# Patient Record
Sex: MALE | Race: WHITE | Employment: OTHER | URBAN - NONMETROPOLITAN AREA
[De-identification: names, ages, dates, MRNs, and addresses within clinical notes are randomized per-mention and may not be internally consistent; named-entity substitution may affect disease eponyms.]

---

## 2017-01-31 ENCOUNTER — COMMUNICATION - GICH (OUTPATIENT)
Dept: INTERNAL MEDICINE | Facility: OTHER | Age: 57
End: 2017-01-31

## 2017-01-31 DIAGNOSIS — C67.9 MALIGNANT NEOPLASM OF BLADDER (H): ICD-10-CM

## 2017-01-31 DIAGNOSIS — R39.89 OTHER SYMPTOMS AND SIGNS INVOLVING THE GENITOURINARY SYSTEM: ICD-10-CM

## 2017-01-31 DIAGNOSIS — C68.9 MALIGNANT NEOPLASM OF URINARY ORGAN (H): ICD-10-CM

## 2017-04-10 ENCOUNTER — COMMUNICATION - GICH (OUTPATIENT)
Dept: INTERNAL MEDICINE | Facility: OTHER | Age: 57
End: 2017-04-10

## 2017-04-10 DIAGNOSIS — J44.9 CHRONIC OBSTRUCTIVE PULMONARY DISEASE (H): ICD-10-CM

## 2017-04-13 ENCOUNTER — HISTORY (OUTPATIENT)
Dept: INTERNAL MEDICINE | Facility: OTHER | Age: 57
End: 2017-04-13

## 2017-04-13 ENCOUNTER — OFFICE VISIT - GICH (OUTPATIENT)
Dept: INTERNAL MEDICINE | Facility: OTHER | Age: 57
End: 2017-04-13

## 2017-04-13 ENCOUNTER — COMMUNICATION - GICH (OUTPATIENT)
Dept: SURGERY | Facility: OTHER | Age: 57
End: 2017-04-13

## 2017-04-13 DIAGNOSIS — E78.5 HYPERLIPIDEMIA: ICD-10-CM

## 2017-04-13 DIAGNOSIS — I10 ESSENTIAL (PRIMARY) HYPERTENSION: ICD-10-CM

## 2017-04-13 DIAGNOSIS — Z85.72 HISTORY OF NON-HODGKIN'S LYMPHOMA: ICD-10-CM

## 2017-04-13 DIAGNOSIS — C62.90 MALIGNANT NEOPLASM OF TESTIS (H): ICD-10-CM

## 2017-04-13 DIAGNOSIS — Z12.11 ENCOUNTER FOR SCREENING FOR MALIGNANT NEOPLASM OF COLON: ICD-10-CM

## 2017-04-13 DIAGNOSIS — N18.30 CHRONIC KIDNEY DISEASE, STAGE III (MODERATE) (H): ICD-10-CM

## 2017-04-13 DIAGNOSIS — C68.9 MALIGNANT NEOPLASM OF URINARY ORGAN (H): ICD-10-CM

## 2017-04-13 DIAGNOSIS — K21.9 GASTRO-ESOPHAGEAL REFLUX DISEASE WITHOUT ESOPHAGITIS: ICD-10-CM

## 2017-04-13 DIAGNOSIS — F17.200 NICOTINE DEPENDENCE, UNCOMPLICATED: ICD-10-CM

## 2017-04-13 DIAGNOSIS — Z00.00 ENCOUNTER FOR GENERAL ADULT MEDICAL EXAMINATION WITHOUT ABNORMAL FINDINGS: ICD-10-CM

## 2017-04-13 DIAGNOSIS — I25.10 ATHEROSCLEROTIC HEART DISEASE OF NATIVE CORONARY ARTERY WITHOUT ANGINA PECTORIS: ICD-10-CM

## 2017-04-13 LAB
A/G RATIO - HISTORICAL: 1.3 (ref 1–2)
ABSOLUTE BASOPHILS - HISTORICAL: 0.1 THOU/CU MM
ABSOLUTE EOSINOPHILS - HISTORICAL: 0.3 THOU/CU MM
ABSOLUTE LYMPHOCYTES - HISTORICAL: 2.1 THOU/CU MM (ref 0.9–2.9)
ABSOLUTE MONOCYTES - HISTORICAL: 0.6 THOU/CU MM
ABSOLUTE NEUTROPHILS - HISTORICAL: 5.6 THOU/CU MM (ref 1.7–7)
ALBUMIN SERPL-MCNC: 4.3 G/DL (ref 3.5–5.7)
ALP SERPL-CCNC: 84 IU/L (ref 34–104)
ALT (SGPT) - HISTORICAL: 11 IU/L (ref 7–52)
ANION GAP - HISTORICAL: 13 (ref 5–18)
AST SERPL-CCNC: 18 IU/L (ref 13–39)
BASOPHILS # BLD AUTO: 0.8 %
BILIRUB SERPL-MCNC: 0.5 MG/DL (ref 0.3–1)
BUN SERPL-MCNC: 39 MG/DL (ref 7–25)
BUN/CREAT RATIO - HISTORICAL: 24
CALCIUM SERPL-MCNC: 9.9 MG/DL (ref 8.6–10.3)
CHLORIDE SERPLBLD-SCNC: 101 MMOL/L (ref 98–107)
CHOL/HDL RATIO - HISTORICAL: 3.5
CHOLESTEROL TOTAL: 168 MG/DL
CO2 SERPL-SCNC: 27 MMOL/L (ref 21–31)
CREAT SERPL-MCNC: 1.61 MG/DL (ref 0.7–1.3)
EOSINOPHIL NFR BLD AUTO: 3.9 %
ERYTHROCYTE [DISTWIDTH] IN BLOOD BY AUTOMATED COUNT: 11.9 % (ref 11.5–15.5)
GFR IF NOT AFRICAN AMERICAN - HISTORICAL: 44 ML/MIN/1.73M2
GLOBULIN - HISTORICAL: 3.4 G/DL (ref 2–3.7)
GLUCOSE SERPL-MCNC: 94 MG/DL (ref 70–105)
HCT VFR BLD AUTO: 50.7 % (ref 37–53)
HDLC SERPL-MCNC: 48 MG/DL (ref 23–92)
HEMOGLOBIN: 16.2 G/DL (ref 13.5–17.5)
LDLC SERPL CALC-MCNC: 93 MG/DL
LYMPHOCYTES NFR BLD AUTO: 24.5 % (ref 20–44)
MCH RBC QN AUTO: 29.5 PG (ref 26–34)
MCHC RBC AUTO-ENTMCNC: 32 G/DL (ref 32–36)
MCV RBC AUTO: 92 FL (ref 80–100)
MONOCYTES NFR BLD AUTO: 6.6 %
NEUTROPHILS NFR BLD AUTO: 64.2 % (ref 42–72)
NON-HDL CHOLESTEROL - HISTORICAL: 120 MG/DL
PATIENT STATUS - HISTORICAL: NORMAL
PLATELET # BLD AUTO: 260 THOU/CU MM (ref 140–440)
PMV BLD: 8.1 FL (ref 6.5–11)
POTASSIUM SERPL-SCNC: 5 MMOL/L (ref 3.5–5.1)
PROT SERPL-MCNC: 7.7 G/DL (ref 6.4–8.9)
RED BLOOD COUNT - HISTORICAL: 5.5 MIL/CU MM (ref 4.3–5.9)
SODIUM SERPL-SCNC: 141 MMOL/L (ref 133–143)
TRIGL SERPL-MCNC: 134 MG/DL
WHITE BLOOD COUNT - HISTORICAL: 8.7 THOU/CU MM (ref 4.5–11)

## 2017-04-26 ENCOUNTER — COMMUNICATION - GICH (OUTPATIENT)
Dept: INTERNAL MEDICINE | Facility: OTHER | Age: 57
End: 2017-04-26

## 2017-04-26 DIAGNOSIS — C67.9 MALIGNANT NEOPLASM OF BLADDER (H): ICD-10-CM

## 2017-04-26 DIAGNOSIS — C68.9 MALIGNANT NEOPLASM OF URINARY ORGAN (H): ICD-10-CM

## 2017-04-26 DIAGNOSIS — R39.89 OTHER SYMPTOMS AND SIGNS INVOLVING THE GENITOURINARY SYSTEM: ICD-10-CM

## 2017-04-26 DIAGNOSIS — I10 ESSENTIAL (PRIMARY) HYPERTENSION: ICD-10-CM

## 2017-05-04 ENCOUNTER — SURGERY (OUTPATIENT)
Dept: SURGERY | Facility: OTHER | Age: 57
End: 2017-05-04

## 2017-05-10 ENCOUNTER — OFFICE VISIT - GICH (OUTPATIENT)
Dept: INTERNAL MEDICINE | Facility: OTHER | Age: 57
End: 2017-05-10

## 2017-05-10 ENCOUNTER — HISTORY (OUTPATIENT)
Dept: INTERNAL MEDICINE | Facility: OTHER | Age: 57
End: 2017-05-10

## 2017-05-10 ENCOUNTER — HOSPITAL ENCOUNTER (OUTPATIENT)
Dept: RADIOLOGY | Facility: OTHER | Age: 57
End: 2017-05-10
Attending: INTERNAL MEDICINE

## 2017-05-10 DIAGNOSIS — R05.9 COUGH: ICD-10-CM

## 2017-05-10 DIAGNOSIS — R50.9 FEVER: ICD-10-CM

## 2017-05-10 LAB
ABSOLUTE BASOPHILS - HISTORICAL: 0.1 THOU/CU MM
ABSOLUTE EOSINOPHILS - HISTORICAL: 0 THOU/CU MM
ABSOLUTE LYMPHOCYTES - HISTORICAL: 0.5 THOU/CU MM (ref 0.9–2.9)
ABSOLUTE MONOCYTES - HISTORICAL: 0.5 THOU/CU MM
ABSOLUTE NEUTROPHILS - HISTORICAL: 5.2 THOU/CU MM (ref 1.7–7)
ANION GAP - HISTORICAL: 10 (ref 5–18)
BASOPHILS # BLD AUTO: 2.4 %
BUN SERPL-MCNC: 24 MG/DL (ref 7–25)
BUN/CREAT RATIO - HISTORICAL: 15
CALCIUM SERPL-MCNC: 9.6 MG/DL (ref 8.6–10.3)
CHLORIDE SERPLBLD-SCNC: 101 MMOL/L (ref 98–107)
CO2 SERPL-SCNC: 22 MMOL/L (ref 21–31)
CREAT SERPL-MCNC: 1.65 MG/DL (ref 0.7–1.3)
EOSINOPHIL NFR BLD AUTO: 0.3 %
ERYTHROCYTE [DISTWIDTH] IN BLOOD BY AUTOMATED COUNT: 12 % (ref 11.5–15.5)
GFR IF NOT AFRICAN AMERICAN - HISTORICAL: 43 ML/MIN/1.73M2
GLUCOSE SERPL-MCNC: 97 MG/DL (ref 70–105)
HCT VFR BLD AUTO: 49.3 % (ref 37–53)
HEMOGLOBIN: 15.6 G/DL (ref 13.5–17.5)
INFLUENZA ANTIGEN - HISTORICAL: NORMAL
LYMPHOCYTES NFR BLD AUTO: 7.6 % (ref 20–44)
MCH RBC QN AUTO: 29 PG (ref 26–34)
MCHC RBC AUTO-ENTMCNC: 31.7 G/DL (ref 32–36)
MCV RBC AUTO: 92 FL (ref 80–100)
MONOCYTES NFR BLD AUTO: 7.3 %
NEUTROPHILS NFR BLD AUTO: 82.5 % (ref 42–72)
PLATELET # BLD AUTO: 234 THOU/CU MM (ref 140–440)
PMV BLD: 7.9 FL (ref 6.5–11)
POTASSIUM SERPL-SCNC: 4 MMOL/L (ref 3.5–5.1)
RED BLOOD COUNT - HISTORICAL: 5.39 MIL/CU MM (ref 4.3–5.9)
SODIUM SERPL-SCNC: 133 MMOL/L (ref 133–143)
WHITE BLOOD COUNT - HISTORICAL: 6.3 THOU/CU MM (ref 4.5–11)

## 2017-05-11 ENCOUNTER — COMMUNICATION - GICH (OUTPATIENT)
Dept: INTERNAL MEDICINE | Facility: OTHER | Age: 57
End: 2017-05-11

## 2017-05-11 LAB — LYME SCREEN W/REFLEX WEST BLOT - HISTORICAL: NEGATIVE

## 2017-05-12 ENCOUNTER — COMMUNICATION - GICH (OUTPATIENT)
Dept: INTERNAL MEDICINE | Facility: OTHER | Age: 57
End: 2017-05-12

## 2017-05-12 LAB
ANAPLASMA PHAGOCYTOPHILUM - HISTORICAL: NEGATIVE
EHRLICHIA CHAFFEENSIS - HISTORICAL: NEGATIVE
EHRLICHIA EWINGII/CANIS - HISTORICAL: NEGATIVE
EHRLICHIA MURIS-LIKE - HISTORICAL: NEGATIVE

## 2017-05-15 ENCOUNTER — AMBULATORY - GICH (OUTPATIENT)
Dept: SCHEDULING | Facility: OTHER | Age: 57
End: 2017-05-15

## 2017-05-15 LAB — CULTURE - HISTORICAL: NORMAL

## 2017-05-23 ENCOUNTER — HISTORY (OUTPATIENT)
Dept: INTERNAL MEDICINE | Facility: OTHER | Age: 57
End: 2017-05-23

## 2017-05-23 ENCOUNTER — OFFICE VISIT - GICH (OUTPATIENT)
Dept: INTERNAL MEDICINE | Facility: OTHER | Age: 57
End: 2017-05-23

## 2017-05-23 DIAGNOSIS — N18.30 CHRONIC KIDNEY DISEASE, STAGE III (MODERATE) (H): ICD-10-CM

## 2017-05-23 DIAGNOSIS — J44.9 CHRONIC OBSTRUCTIVE PULMONARY DISEASE (H): ICD-10-CM

## 2017-05-23 DIAGNOSIS — C68.9 MALIGNANT NEOPLASM OF URINARY ORGAN (H): ICD-10-CM

## 2017-08-08 ENCOUNTER — COMMUNICATION - GICH (OUTPATIENT)
Dept: INTERNAL MEDICINE | Facility: OTHER | Age: 57
End: 2017-08-08

## 2017-08-08 DIAGNOSIS — I10 ESSENTIAL (PRIMARY) HYPERTENSION: ICD-10-CM

## 2017-08-13 ENCOUNTER — COMMUNICATION - GICH (OUTPATIENT)
Dept: INTERNAL MEDICINE | Facility: OTHER | Age: 57
End: 2017-08-13

## 2017-08-13 DIAGNOSIS — I10 ESSENTIAL (PRIMARY) HYPERTENSION: ICD-10-CM

## 2017-08-21 ENCOUNTER — COMMUNICATION - GICH (OUTPATIENT)
Dept: INTERNAL MEDICINE | Facility: OTHER | Age: 57
End: 2017-08-21

## 2017-09-23 ENCOUNTER — COMMUNICATION - GICH (OUTPATIENT)
Dept: INTERNAL MEDICINE | Facility: OTHER | Age: 57
End: 2017-09-23

## 2017-09-23 DIAGNOSIS — K21.9 GASTRO-ESOPHAGEAL REFLUX DISEASE WITHOUT ESOPHAGITIS: ICD-10-CM

## 2017-09-26 ENCOUNTER — COMMUNICATION - GICH (OUTPATIENT)
Dept: INTERNAL MEDICINE | Facility: OTHER | Age: 57
End: 2017-09-26

## 2017-10-18 ENCOUNTER — OFFICE VISIT - GICH (OUTPATIENT)
Dept: INTERNAL MEDICINE | Facility: OTHER | Age: 57
End: 2017-10-18

## 2017-10-18 ENCOUNTER — HISTORY (OUTPATIENT)
Dept: INTERNAL MEDICINE | Facility: OTHER | Age: 57
End: 2017-10-18

## 2017-10-18 DIAGNOSIS — C68.9 MALIGNANT NEOPLASM OF URINARY ORGAN (H): ICD-10-CM

## 2017-10-18 LAB
BACTERIA URINE: ABNORMAL BACTERIA/HPF
BILIRUB UR QL: NEGATIVE
CLARITY, URINE: CLEAR CLARITY
COLOR UR: YELLOW COLOR
EPITHELIAL CELLS: ABNORMAL EPI/HPF
GLUCOSE URINE: NEGATIVE MG/DL
KETONES UR QL: NEGATIVE MG/DL
LEUKOCYTE ESTERASE URINE: NEGATIVE
NITRITE UR QL STRIP: NEGATIVE
OCCULT BLOOD,URINE - HISTORICAL: ABNORMAL
PH UR: 6 [PH]
PROTEIN QUALITATIVE,URINE - HISTORICAL: ABNORMAL MG/DL
RBC - HISTORICAL: ABNORMAL /HPF
SP GR UR STRIP: 1.02
UROBILINOGEN,QUALITATIVE - HISTORICAL: NORMAL EU/DL
WBC - HISTORICAL: ABNORMAL /HPF

## 2017-10-18 ASSESSMENT — PATIENT HEALTH QUESTIONNAIRE - PHQ9: SUM OF ALL RESPONSES TO PHQ QUESTIONS 1-9: 0

## 2017-11-14 ENCOUNTER — COMMUNICATION - GICH (OUTPATIENT)
Dept: INTERNAL MEDICINE | Facility: OTHER | Age: 57
End: 2017-11-14

## 2017-11-14 DIAGNOSIS — J44.9 CHRONIC OBSTRUCTIVE PULMONARY DISEASE (H): ICD-10-CM

## 2017-12-04 ENCOUNTER — COMMUNICATION - GICH (OUTPATIENT)
Dept: INTERNAL MEDICINE | Facility: OTHER | Age: 57
End: 2017-12-04

## 2017-12-04 DIAGNOSIS — J44.9 CHRONIC OBSTRUCTIVE PULMONARY DISEASE (H): ICD-10-CM

## 2017-12-19 ENCOUNTER — COMMUNICATION - GICH (OUTPATIENT)
Dept: INTERNAL MEDICINE | Facility: OTHER | Age: 57
End: 2017-12-19

## 2017-12-19 DIAGNOSIS — C68.9 MALIGNANT NEOPLASM OF URINARY ORGAN (H): ICD-10-CM

## 2017-12-28 NOTE — TELEPHONE ENCOUNTER
Patient Information     Patient Name MRN Sex Luis A Garcia [id number] Male 1960      Telephone Encounter by Isha Raines RN at 2017  3:46 PM     Author:  Isha Raines RN Service:  (none) Author Type:  NURS- Registered Nurse     Filed:  2017  3:49 PM Encounter Date:  2017 Status:  Signed     :  Isha Raines RN (NURS- Registered Nurse)            Calcium Channel Blockers    Office visit in the past 12 months or per provider note.    Last visit with SYLVAIN DYER was on: 2016 in [facility]  Next visit with SYLVAIN DYER is on: No future appointment listed with this provider  Next visit with Internal Medicine is on: No future appointment listed in this department  BP Readings from Last 4 Encounters:    17 128/86   05/10/17 122/92   17 116/72   16 118/68     Patient is due for an appointment, letter sent.  Review last provider visit note.  If BP reviewed and plan is noted, can refill.  Max refill for 12 months from last office visit or per provider note.  Prescription refilled per RN Medication Refill Policy.................... ISHA RAINES RN ....................  2017   3:47 PM

## 2017-12-28 NOTE — TELEPHONE ENCOUNTER
Patient Information     Patient Name MRN Sex Luis A Garcia [id number] Male 1960      Telephone Encounter by Sherrie Estrella RN at 2017  2:33 PM     Author:  Sherrie Estrella RN Service:  (none) Author Type:  NURS- Registered Nurse     Filed:  2017  2:40 PM Encounter Date:  2017 Status:  Signed     :  Sherrie Estrella RN (NURS- Registered Nurse)            Per pharmacist Trevor, they never received order on 17 for omeprazole. Verbal order given, read back, and verified.  Sherrie Estrella LPN..................2017  2:40 PM

## 2017-12-28 NOTE — PROGRESS NOTES
Patient Information     Patient Name MRN Sex Luis A Garcia [id number] Male 1960      Progress Notes by Tin Fink MD at 10/18/2017  9:00 AM     Author:  Tin Fink MD Service:  (none) Author Type:  Physician     Filed:  10/18/2017  9:52 AM Encounter Date:  10/18/2017 Status:  Signed     :  Tin Fink MD (Physician)            SUBJECTIVE:    Luis A Gonzales is a 57 y.o. male who presents for urinary complaints.    HPI Comments: This patient is here today with the complaints of blood in his urine. This happened earlier this week on one occasion. He has a history of bladder cancer. He will be going back down to the [facility] shortly for follow-up cystoscopy. He wants to make sure that he doesn't have an infection in his urine because they won't do a cystoscopy if that is the case. He otherwise feels well. The urinary problem was entirely asymptomatic.      Allergies     Allergen  Reactions     Statins-Hmg-Coa Reductase Inhibitors Myalgia     Venom-Honey Bee Anaphylaxis   ,   Current Outpatient Prescriptions     Medication  Sig     amLODIPine (NORVASC) 10 mg tablet TAKE 1 TABLET BY MOUTH EVERY DAY     aspirin chewable 81 mg chewable tablet Take 1 tablet by mouth once daily with a meal.     carvedilol (COREG) 3.125 mg tablet TAKE 1 TABLET BY MOUTH TWICE DAILY     NITROSTAT 0.4 mg sublingual tablet      omeprazole (PRILOSEC) 20 mg Delayed-Release capsule Take 1 capsule by mouth once daily before a meal.     VENTOLIN HFA 90 mcg/actuation inhaler INHALE 1 TO 2 PUFFS BY MOUTH EVERY 2 HOURS IF NEEDED FOR SHORTNESS OF BREATH OR WHEEZING     No current facility-administered medications for this visit.      Medications have been reviewed by me and are current to the best of my knowledge and ability. ,   Past Medical History:     Diagnosis  Date     Bladder cancer (HC)      CAD (coronary artery disease)     MI times 3      CKD (chronic kidney disease) stage 3, GFR 30-59 ml/min      Elevated  hemoglobin (HC) 06/22/09    19.7, likely secondary to tobacco abuse.  Will consider further evaluation for polycythemia vera in August 2009 when patient follows up for complete physical.       Hyperlipidemia - Intolerant of Statins 2/12/2013     Hypertension      Lymphoma in remission (HC)      Peripheral neuropathy (HC)     felt to be related to chemo      Radiation-induced heart disease - Coronary Artery Disease 2/12/2013     Testicular cancer (HC)     and   Patient Active Problem List       Diagnosis  Date Noted     Urothelial carcinoma (HCC) - of Bladder  06/14/2016     Laryngopharyngeal reflux disease  06/17/2015     Erectile dysfunction  06/17/2015     COPD (chronic obstructive pulmonary disease) (HC)  06/17/2015     Squamous papilloma of soft palate - Dx 8/2014 - [facility]  08/20/2014     POSSIBLE -- Radiation-induced heart disease - Coronary Artery Disease  02/12/2013     Hyperlipidemia - Intolerant of Statins  02/12/2013     Hypertension       PERICARDIAL EFFUSION S/P STENT AND PERICARDIOCENTESIS  02/18/2011     ACP (advance care planning)  02/11/2011     Patient has identified Health Care Agent(s): No  Add Health Care Agents: No  Patient has Advance Care Plan Documents (Health Care Directive, POLST): No, Pt declined information..    Patient has identified Specific Treatment Preferences: No   Specific limits to treatment preferences NOT identified: ASSUME FULL TREATMENT.    2/11/11:  Pt states that his family knows his wishes but that he hasn't put them down formally in witting. He declined receiving written information at this time but stated that his cousin, Dr. Trevor Cisneros- Head of Emergency Services at [facility] would probably be his HCA although he hasn't talked with him about this responsibility to date.   Nohemi Suarez, [facility] Hospital[facility]...Pager: [phone]  : [phone]              CAD (coronary artery disease)  02/11/2011     -Level 1 STEMI 2/9/11 at [facility] - successful PCI BMSx2 of proximal LAD   -  Following angio, developed cardiac tamponade from contained perforation, s/p pericardiocentesis with drain left in, total of 250 ccs removed, started on colchicine  - 5/6/11 cor angio for CP: patent stents  - Level 1 STEMI 2/2/2013 at [facility] - Due to late in-stent thrombosis with preserved EF of 50-55% - successful PTCA to the entire LAD; thrombectomy; IC Eptifibatide, and BMS x1 and JOSE ANTONIO x2 to the mid LAD with coronary thrombectomy 2/3/2013        CKD (chronic kidney disease) stage 3, GFR 30-59 ml/min       Tobacco dependency  02/09/2011     Testicular cancer (HC)  02/09/2011     Lymphoma in remission (HC)  02/09/2011     APHTHOUS ULCERS  11/11/2010       REVIEW OF SYSTEMS:  Review of Systems   All other systems reviewed and are negative.      OBJECTIVE:  /82  Pulse 72  Ht 1.829 m (6')  Wt 102.6 kg (226 lb 3.2 oz)  BMI 30.68 kg/m2    EXAM:   Physical Exam   Constitutional: He is well-developed, well-nourished, and in no distress. No distress.   Skin: He is not diaphoretic.   Nursing note and vitals reviewed.      ASSESSMENT/PLAN:    ICD-10-CM    1. Urothelial carcinoma (HCC) - of Bladder C68.9 URINALYSIS W REFLEX MICROSCOPIC IF POSITIVE      URINALYSIS W REFLEX MICROSCOPIC IF POSITIVE      URINALYSIS MICROSCOPIC      URINALYSIS MICROSCOPIC        Plan:  Urine today shows microscopic hematuria but no evidence for infection. He was reassured. He will follow-up with [facility] next week for his cystoscopy as planned.

## 2017-12-28 NOTE — TELEPHONE ENCOUNTER
Patient Information     Patient Name MRN Sex Luis A Garcia [id number] Male 1960      Telephone Encounter by Barbara Chung RN at 2017  9:43 AM     Author:  Barbara Chung RN Service:  (none) Author Type:  NURS- Registered Nurse     Filed:  2017  9:44 AM Encounter Date:  2017 Status:  Signed     :  Barbara Chung RN (NURS- Registered Nurse)            Calcium Channel Blockers    Office visit in the past 12 months or per provider note.    Last visit with ALYSSA KELLY was on: 2017 in [facility]  Next visit with ALYSSA KELLY is on: No future appointment listed with this provider  Next visit with Internal Medicine is on: No future appointment listed in this department  BP Readings from Last 4 Encounters:    17 128/86   05/10/17 122/92   17 116/72   16 118/68       Review last provider visit note.  If BP reviewed and plan is noted, can refill.  Max refill for 12 months from last office visit or per provider note.    Prescription refilled per RN Medication Refill Policy.................... BARBARA CHUNG RN ....................  2017   9:43 AM

## 2017-12-28 NOTE — TELEPHONE ENCOUNTER
Patient Information     Patient Name MRN Sex Luis A Garcia [id number] Male 1960      Telephone Encounter by Gloria Fuchs LPN at 2017 12:50 PM     Author:  Gloria Fuchs LPN Service:  (none) Author Type:  NURS- Licensed Practical Nurse     Filed:  2017 12:56 PM Encounter Date:  2017 Status:  Signed     :  Gloria Fuchs LPN (NURS- Licensed Practical Nurse)            The patient called and stated last summer he used an nebulizer to help with his COPD. He stated he called his insurance company and said they would pay for one for him without being seen and needs the prescriptions faxed to his insurance company and sent to [facility]. The fax number is [phone]. Orders are teed up below.  Gloria Fuchs LPN......2017  12:55 PM

## 2017-12-28 NOTE — TELEPHONE ENCOUNTER
Patient Information     Patient Name MRN Sex Luis A Garcia [id number] Male 1960      Telephone Encounter by Gloria Fuchs LPN at 2017  8:05 AM     Author:  Gloria Fuchs LPN Service:  (none) Author Type:  NURS- Licensed Practical Nurse     Filed:  2017  8:08 AM Encounter Date:  2017 Status:  Signed     :  Gloria Fuchs LPN (NURS- Licensed Practical Nurse)            Contacted the patient and let him know on  a prescription was sent in to his pharmacy for his amlodipine with 90 tablets and two refills.  Gloria Fuchs LPN......2017  8:08 AM

## 2017-12-28 NOTE — TELEPHONE ENCOUNTER
Patient Information     Patient Name MRN Sex Luis A Garcia [id number] Male 1960      Telephone Encounter by Radha Dowd RN at 2017  1:35 PM     Author:  Radha Dowd RN Service:  (none) Author Type:  NURS- Registered Nurse     Filed:  2017  1:37 PM Encounter Date:  2017 Status:  Signed     :  Radha Dowd RN (NURS- Registered Nurse)            Prilosec refilled on 17 #180 x 2 refills to [facility]  Unable to complete prescription refill per RN Medication Refill Policy.................... RADHA DOWD RN ....................  2017   1:36 PM

## 2017-12-28 NOTE — TELEPHONE ENCOUNTER
Patient Information     Patient Name MRN Sex Luis A Garcia [id number] Male 1960      Telephone Encounter by Isha Raines RN at 2017  3:40 PM     Author:  Isha Raines RN Service:  (none) Author Type:  NURS- Registered Nurse     Filed:  2017  3:40 PM Encounter Date:  2017 Status:  Signed     :  Isha Raines RN (NURS- Registered Nurse)            Refilled 17.  Unable to complete prescription refill per RN Medication Refill Policy.................... ISHA RAINES RN ....................  2017   3:40 PM

## 2017-12-30 NOTE — NURSING NOTE
Patient Information     Patient Name MRN Sex Luis A Garcia [id number] Male 1960      Nursing Note by Gloria Fuchs LPN at 10/18/2017  9:00 AM     Author:  Gloria Fuchs LPN Service:  (none) Author Type:  NURS- Licensed Practical Nurse     Filed:  10/18/2017  9:10 AM Encounter Date:  10/18/2017 Status:  Signed     :  Gloria Fuchs LPN (NURS- Licensed Practical Nurse)            The patient is here today to be seen for a urinary issue. He states on Monday 10-16 he urinated and saw lots of blood.  Gloria Fuchs LPN......10/18/2017  8:57 AM

## 2018-01-03 NOTE — TELEPHONE ENCOUNTER
Patient Information     Patient Name MRN Sex Luis A Garcia [id number] Male 1960      Telephone Encounter by Mariella Walsh at 2017  4:50 PM     Author:  Mariella Walsh Service:  (none) Author Type:  (none)     Filed:  2017  4:51 PM Encounter Date:  2017 Status:  Signed     :  Mariella Walsh            Prescription printed and faxed to [facility].   Mariella Walsh CMA (AAMA)........2017 4:50 PM

## 2018-01-03 NOTE — TELEPHONE ENCOUNTER
Patient Information     Patient Name MRN Sex Luis A Garcia [id number] Male 1960      Telephone Encounter by Radha Peterson RN at 2017  2:50 PM     Author:  Radha Peterson RN Service:  (none) Author Type:  NURS- Registered Nurse     Filed:  2017  2:52 PM Encounter Date:  2017 Status:  Signed     :  Radha Peterson RN (NURS- Registered Nurse)            traMADol (ULTRAM) 50 mg tablet  TAKE 1 TABLET BY MOUTH 3 TIMES DAILY IF NEEDED FOR PAIN.  Disp: 30 tablet Refills: 0    Class: eRx Start: 2017    For: Urothelial carcinoma (HC), Malignant neoplasm of urinary bladder, unspecified site, Bladder pain  Documented:7 months ago  Last refill: 2016  To be filled at: [facility] [address] [place] [address] AT [address] - [phone]: [phone]  Last visit with SYLVAIN FLORES was on: 2016 in [facility]  PCP:  Sylvain Flores MD  Controlled Substance Agreement:  None noted, noted as cancer diagnosis     Unable to complete prescription refill per RN Medication Refill Policy.................... RADHA PETERSON RN ....................  2017   2:51 PM

## 2018-01-04 NOTE — PROGRESS NOTES
Patient Information     Patient Name MRN Sex Luis A Garcia [id number] Male 1960      Progress Notes by Tin Fink MD at 2017  9:10 AM     Author:  Tin Fink MD Service:  (none) Author Type:  Physician     Filed:  2017  9:51 AM Encounter Date:  2017 Status:  Signed     :  Tin Fink MD (Physician)            SUBJECTIVE:    Luis A Gonzales is a 56 y.o. male who presents for comprehensive review of their multiple medical problems and review of medications, renewal of medications and update on necessary health maintenance issues.      HPI Comments: He is in today for complete evaluation and review his chronic medical problems. He has an extensive past medical history which includes coronary artery disease with MI and stenting on 3 separate occasions. In addition, he has a history of bladder cancer, testicular cancer and lymphoma. He has had chemotherapy and radiation. He does get his cystoscopies done regularly at the [facility] for follow-up on the bladder cancer. Despite this, he continues to smoke. I strongly encouraged him to discontinue this habit.    He feels as though his heart disease was secondary to his testosterone usage. He no longer uses this. We did talk about the possibility of radiation-induced heart disease but he is not even sure that he had radiation over the heart area. The radiation was done for his testicular cancer.    He was feeling fine until last night and this morning when he's had a little bit of lightheadedness and disequilibrium. He wonders what that might be from. I spent a long time today reviewing his past medical history, problem list, past surgical history, family history and social history and those are up-to-date. His medications were reconciled as well.      Allergies     Allergen  Reactions     Statins-Hmg-Coa Reductase Inhibitors Myalgia     Venom-Honey Bee Anaphylaxis   ,   Current Outpatient Prescriptions     Medication  Sig      amLODIPine (NORVASC) 10 mg tablet TAKE 1 TABLET BY MOUTH ONCE DAILY     aspirin chewable 81 mg chewable tablet Take 1 tablet by mouth once daily with a meal.     carvedilol (COREG) 3.125 mg tablet Take 1 tablet by mouth 2 times daily with meals.     NITROSTAT 0.4 mg sublingual tablet      omeprazole (PRILOSEC) 20 mg Delayed-Release capsule Take 1 capsule by mouth once daily before a meal.     traMADol (ULTRAM) 50 mg tablet TAKE 1 TABLET BY MOUTH 3 TIMES DAILY IF NEEDED FOR PAIN.     VENTOLIN HFA 90 mcg/actuation inhaler INHALE 1 TO 2 PUFFS BY MOUTH EVERY 2 HOURS IF NEEDED FOR SHORTNESS OF BREATH OR WHEEZING     No current facility-administered medications for this visit.      Medications have been reviewed by me and are current to the best of my knowledge and ability. ,   Past Medical History:     Diagnosis  Date     Bladder cancer (HC)      CAD (coronary artery disease)     MI times 3      CKD (chronic kidney disease) stage 3, GFR 30-59 ml/min      Elevated hemoglobin (HC) 06/22/09    19.7, likely secondary to tobacco abuse.  Will consider further evaluation for polycythemia vera in August 2009 when patient follows up for complete physical.       Hyperlipidemia - Intolerant of Statins 2/12/2013     Hypertension      Lymphoma in remission      Peripheral neuropathy (HC)     felt to be related to chemo      Radiation-induced heart disease - Coronary Artery Disease 2/12/2013     Testicular cancer (HC)    ,   Patient Active Problem List       Diagnosis  Date Noted     Urothelial carcinoma (HCC) - of Bladder  06/14/2016     Laryngopharyngeal reflux disease  06/17/2015     Erectile dysfunction  06/17/2015     COPD (chronic obstructive pulmonary disease) (HC)  06/17/2015     Squamous papilloma of soft palate - Dx 8/2014 - [facility]  08/20/2014     POSSIBLE -- Radiation-induced heart disease - Coronary Artery Disease  02/12/2013     Hyperlipidemia - Intolerant of Statins  02/12/2013     Hypertension       PERICARDIAL  EFFUSION S/P STENT AND PERICARDIOCENTESIS  02/18/2011     ACP (advance care planning)  02/11/2011     Patient has identified Health Care Agent(s): No  Add Health Care Agents: No  Patient has Advance Care Plan Documents (Health Care Directive, POLST): No, Pt declined information..    Patient has identified Specific Treatment Preferences: No   Specific limits to treatment preferences NOT identified: ASSUME FULL TREATMENT.    2/11/11:  Pt states that his family knows his wishes but that he hasn't put them down formally in witting. He declined receiving written information at this time but stated that his cousin, Dr. Trevor Cisneros- Head of Emergency Services at [facility] would probably be his HCA although he hasn't talked with him about this responsibility to date.   Nohemi Suarez, [facility]...Pager: [phone]  VM: [phone]              CAD (coronary artery disease)  02/11/2011     -Level 1 STEMI 2/9/11 at [facility] - successful PCI BMSx2 of proximal LAD   - Following angio, developed cardiac tamponade from contained perforation, s/p pericardiocentesis with drain left in, total of 250 ccs removed, started on colchicine  - 5/6/11 cor angio for CP: patent stents  - Level 1 STEMI 2/2/2013 at [facility] - Due to late in-stent thrombosis with preserved EF of 50-55% - successful PTCA to the entire LAD; thrombectomy; IC Eptifibatide, and BMS x1 and JOSE ANTONIO x2 to the mid LAD with coronary thrombectomy 2/3/2013        CKD (chronic kidney disease) stage 3, GFR 30-59 ml/min       Tobacco dependency  02/09/2011     Testicular cancer (HC)  02/09/2011     Lymphoma in remission  02/09/2011     APHTHOUS ULCERS  11/11/2010   ,   Past Surgical History:      Procedure  Laterality Date     APPENDECTOMY       Bilateral cataract surgery   2006     CORONARY STENT PLACEMENT      Stents multiple occasions       CYSTOSCOPY      Bladder cancer resection       LYMPH NODE DISSECTION       ORCHIECTOMY Left      Right testicular orchiectomy       at [facility]      and   Social History       Substance Use Topics         Smoking status:   Current Some Day Smoker     Packs/day:  0.25     Years:  35.00     Types:  Cigarettes     Last attempt to quit:  2013     Smokeless tobacco:   Never Used     Alcohol use   No      Comment: occasional      Family Status     Relation  Status     Father      Mother Alive     Paternal Grandfather      Sister Alive     Brother Alive     Brother Alive     Social History     Social History        Marital status:  Single     Spouse name: N/A     Number of children:  N/A     Years of education:  N/A     Social History Main Topics         Smoking status:   Current Some Day Smoker     Packs/day:  0.25     Years:  35.00     Types:  Cigarettes     Last attempt to quit:  2013     Smokeless tobacco:   Never Used     Alcohol use   No      Comment: occasional      Drug use:   No     Sexual activity:   Not Asked     Other Topics  Concern     None      Social History Narrative     Has significant other times 18 years, lives in town.  Not employed.       REVIEW OF SYSTEMS:  Review of Systems   Constitutional: Negative for chills, diaphoresis, fever, malaise/fatigue and weight loss.   HENT: Negative for congestion, ear pain, nosebleeds, sore throat and tinnitus.    Eyes: Negative for blurred vision, double vision, photophobia, pain, discharge and redness.   Respiratory: Negative for cough, hemoptysis, sputum production, shortness of breath and wheezing.    Cardiovascular: Negative for chest pain, palpitations, orthopnea, claudication, leg swelling and PND.   Gastrointestinal: Negative for abdominal pain, blood in stool, constipation, diarrhea, heartburn, nausea and vomiting.   Genitourinary: Negative for dysuria, flank pain and hematuria.   Musculoskeletal: Negative for back pain, joint pain, myalgias and neck pain.   Skin: Negative for itching and rash.   Neurological: Positive for dizziness. Negative for tingling,  tremors, speech change, loss of consciousness, weakness and headaches.   Psychiatric/Behavioral: Negative for depression, hallucinations, memory loss, substance abuse and suicidal ideas. The patient is not nervous/anxious.        OBJECTIVE:  /72  Pulse 60  Ht 1.829 m (6')  Wt 98 kg (216 lb)  BMI 29.29 kg/m2    EXAM:   Physical Exam   Constitutional: He is oriented to person, place, and time and well-developed, well-nourished, and in no distress. No distress.   HENT:   Head: Normocephalic and atraumatic.   Right Ear: Tympanic membrane and external ear normal.   Left Ear: Tympanic membrane and external ear normal.   Nose: Nose normal.   Mouth/Throat: Oropharynx is clear and moist and mucous membranes are normal. No oropharyngeal exudate.   Eyes: Conjunctivae are normal. Pupils are equal, round, and reactive to light. No scleral icterus.   Neck: Normal range of motion. Neck supple. Normal carotid pulses, no hepatojugular reflux and no JVD present. Carotid bruit is not present. No tracheal deviation and no edema present. No thyroid mass and no thyromegaly present.   Cardiovascular: Normal rate, regular rhythm, normal heart sounds and intact distal pulses.  Exam reveals no gallop and no friction rub.    No murmur heard.  Pulmonary/Chest: Effort normal and breath sounds normal. No respiratory distress. He has no decreased breath sounds. He has no wheezes. He has no rhonchi. He has no rales. He exhibits no tenderness.   Abdominal: Soft. Bowel sounds are normal. He exhibits no distension and no mass. There is no hepatosplenomegaly. There is no tenderness. There is no rebound and no guarding. Hernia confirmed negative in the right inguinal area and confirmed negative in the left inguinal area.   Musculoskeletal: Normal range of motion. He exhibits no edema or tenderness.   Lymphadenopathy:     He has no cervical adenopathy.   Neurological: He is alert and oriented to person, place, and time. He has normal motor  skills. He displays normal reflexes. No cranial nerve deficit. He exhibits normal muscle tone. Gait normal. Coordination normal.   Slight nystagmus with lateral gaze   Skin: Skin is warm and dry. No rash noted. He is not diaphoretic. No cyanosis or erythema. No pallor. Nails show no clubbing.   Psychiatric: Mood, memory, affect and judgment normal.   Nursing note and vitals reviewed.      ASSESSMENT/PLAN:    ICD-10-CM    1. Coronary artery disease, angina presence unspecified, unspecified vessel or lesion type, unspecified whether native or transplanted heart I25.10 COMPLETE METABOLIC PANEL      LIPID PANEL      COMPLETE METABOLIC PANEL      LIPID PANEL   2. Laryngopharyngeal reflux disease J38.7 omeprazole (PRILOSEC) 20 mg Delayed-Release capsule   3. Lymphoma in remission Z85.72 CBC WITH DIFFERENTIAL      CBC WITH DIFFERENTIAL      CBC WITH AUTO DIFFERENTIAL   4. Malignant neoplasm of testis, unspecified laterality, unspecified whether descended or undescended (HC) C62.90    5. Tobacco dependency F17.200    6. CKD (chronic kidney disease) stage 3, GFR 30-59 ml/min N18.3    7. Hypertension I10    8. Hyperlipidemia, unspecified hyperlipidemia type E78.5    9. Urothelial carcinoma (HCC) - of Bladder C68.9    10. Health care maintenance Z00.00 COLONOSCOPY SCREENING-GICH        Plan:  Overall the patient seems to be doing well. His dizzy spells or disequilibrium is currently not present and his exam is normal. Unless these recur, no further investigation. Complete lab drawn and pending, I will send him a letter with the results. He will continue with all of his follow-up visits at the [facility] as previously scheduled. He is going to quit taking the ranitidine since he is taking the omeprazole daily.    He is in need of colon cancer screening. He will be scheduled for a colonoscopy in the near future. He is okay for this procedure without contraindication.    Follow-up will be dependent on how he feels as well as the  results of his lab work.

## 2018-01-04 NOTE — TELEPHONE ENCOUNTER
Patient Information     Patient Name MRN Sex Luis A Garcia [id number] Male 1960      Telephone Encounter by Radha Peterson RN at 2017  4:04 PM     Author:  Radha Peterson RN Service:  (none) Author Type:  NURS- Registered Nurse     Filed:  2017  4:07 PM Encounter Date:  2017 Status:  Signed     :  Radha Peterson RN (NURS- Registered Nurse)            traMADol (ULTRAM) 50 mg tablet  TAKE 1 TABLET BY MOUTH THREE TIMES DAILY AS NEEDED FOR PAIN  Disp: 30 tablet Refills: 0    Class: eRx Start: 2017    For: Urothelial carcinoma (HC), Malignant neoplasm of urinary bladder, unspecified site (HC), Bladder pain  Documented:10 months ago  Last refill:2017  CARVEDILOL 3.125MG TABLETS  In chart as: carvedilol (COREG) 3.125 mg tablet  TAKE 1 TABLET BY MOUTH TWICE DAILY       Disp: 200 tablet Refills: 0    Class: eRx Start: 2017    Documented:10 months ago  Last refill:2017  To be filled at: [facility] [address] [place] [address] [address] - [phone]: [phone]  Carvedilol noted as historical and unsure which diagnosis to associate with    Last visit with ALYSSA KELLY was on: 2017 in [facility]  PCP:  Alyssa Kelly MD  Controlled Substance Agreement:  cancer       Unable to complete prescription refill per RN Medication Refill Policy.................... RADHA PETERSON RN ....................  2017   4:05 PM

## 2018-01-04 NOTE — TELEPHONE ENCOUNTER
Patient Information     Patient Name MRN Sex Luis A Garcia [id number] Male 1960      Telephone Encounter by Alix Gallardo at 2017 12:50 PM     Author:  Alix Gallardo Service:  (none) Author Type:  (none)     Filed:  2017 12:55 PM Encounter Date:  2017 Status:  Signed     :  Alix Gallardo            Screening Questions for the Scheduling of Screening Colonoscopies   (If Colonoscopy is diagnostic, Provider should review the chart before scheduling.)  Are you younger than 50 or older than 80?  NO  Do you take aspirin or fish oil?  YES ASPRIN  (if yes, tell patient to stop 1 week prior to Colonoscopy)  Do you take warfarin (Coumadin), clopidogrel (Plavix), apixaban (Eliquis), dabigatram (Pradaxa), rivaroxaban (Xarelto) or any blood thinner? NO  Do you use oxygen at home?  NO  Do you have kidney disease? STAGE 3 KIDNEY FAILURE  Are you on dialysiS NO  Have you had a stroke or heart attack in the last year? NO  Have you had a stent in your heart or any blood vessel in the last year? NO  Have you had a transplant of any organ? NO  Have you had a colonoscopy or upper endoscopy (EGD) before? NO          When?  NO  Date of scheduled Colonoscopy. 2017  Provider HANSEN   [facility]

## 2018-01-04 NOTE — TELEPHONE ENCOUNTER
Patient Information     Patient Name MRN Sex Luis A Garcia [id number] Male 1960      Telephone Encounter by Aminah Saldana RN at 4/10/2017  3:21 PM     Author:  Aminah Saldana RN Service:  (none) Author Type:  NURS- Registered Nurse     Filed:  4/10/2017  3:24 PM Encounter Date:  4/10/2017 Status:  Signed     :  Aminah Saldana RN (NURS- Registered Nurse)            Bronchodilator Inhalers     Office visit in the past 12 months.    Last visit with SYLVAIN DYER was on: 2016 in [facility]  Next visit with SYLVAIN DYER is on: No future appointment listed with this provider  Next visit with Internal Medicine is on: 2017 in [facility]    Max refills 12 months from last office visit.    Prescription refilled per RN Medication Refill Policy.................... Aminah Saldana RN ....................  4/10/2017   3:21 PM

## 2018-01-04 NOTE — NURSING NOTE
Patient Information     Patient Name MRN Sex Luis A Garcia [id number] Male 1960      Nursing Note by Gloria Fuchs LPN at 2017  9:10 AM     Author:  Gloria Fuchs LPN Service:  (none) Author Type:  NURS- Licensed Practical Nurse     Filed:  2017  9:19 AM Encounter Date:  2017 Status:  Signed     :  Gloria Fuchs LPN (NURS- Licensed Practical Nurse)            The patient is here today to have a yearly check up done.  Gloria Fuchs LPN......2017  9:03 AM

## 2018-01-04 NOTE — TELEPHONE ENCOUNTER
Patient Information     Patient Name MRN Sex Luis A Garcia [id number] Male 1960      Telephone Encounter by Rochelle Ying at 2017  4:34 PM     Author:  Rochelle Ying Service:  (none) Author Type:  (none)     Filed:  2017  4:34 PM Encounter Date:  2017 Status:  Signed     :  Rochelle Ying            Signed prescription was faxed to pharmacy.  Rochelle Ying LPN        2017 4:34 PM

## 2018-01-05 NOTE — TELEPHONE ENCOUNTER
Patient Information     Patient Name MRN Sex Luis A Garcia [id number] Male 1960      Telephone Encounter by Tin Fink MD at 2017  9:45 AM     Author:  Tin Fink MD Service:  (none) Author Type:  Physician     Filed:  2017  9:45 AM Encounter Date:  2017 Status:  Signed     :  Tin Fink MD (Physician)            I would agree that it would be unlikely for that herbicide to cause his current symptoms

## 2018-01-05 NOTE — TELEPHONE ENCOUNTER
Patient Information     Patient Name MRN Sex Luis A Garcia [id number] Male 1960      Telephone Encounter by Gloria Fuchs LPN at 2017  9:46 AM     Author:  Gloria Fuchs LPN Service:  (none) Author Type:  NURS- Licensed Practical Nurse     Filed:  2017  9:47 AM Encounter Date:  2017 Status:  Signed     :  Gloria Fuchs LPN (NURS- Licensed Practical Nurse)            Contacted the patient and gave him the information below.  Gloria Fuchs LPN......2017  9:47 AM

## 2018-01-05 NOTE — PROGRESS NOTES
Patient Information     Patient Name MRN Sex Luis A Garcia [id number] Male 1960      Progress Notes by Tin Fink MD at 2017 10:00 AM     Author:  Tin Fink MD Service:  (none) Author Type:  Physician     Filed:  2017 11:07 AM Encounter Date:  2017 Status:  Signed     :  Tin Fink MD (Physician)            SUBJECTIVE:    Luis A Gonzales is a 57 y.o. male who presents for hospital f/u.    HPI Comments: He comes in today for hospital follow-up. He had blood in his urine so he went to the [facility] because of his previous history of bladder cancer. His urine there was unremarkable but he was found to be suffering from some prerenal azotemia. He does have some baseline chronic kidney disease with a creatinine of approximately 1.6 but his creatinine was well over 2 when he was seen in the emergency room. He was given some IV fluids and was seen by urology. He was started on Zosyn and was discharged on Levaquin. The Levaquin has subsequently been discontinued due to side effects. He has been doing a much better job with keeping fluid intake high. Some of the reason he got prerenal was probably due to his fevers and subsequent fluid deficits. He is scheduled to go back to the [facility] at the end of  for follow-up cystoscopy.    In regards to the fevers that he had here that we investigated, nothing was found. He's having no further problems with those treated it's unclear whether this was related to some sort of tick bite or other process but as long as everything has resolved and nothing new has turned up, I don't think we need to do anything different.    He was sent home on DuoNeb and was also started on Advair. He tells me that he has quit smoking, I encouraged him to stay off cigarettes.      Allergies     Allergen  Reactions     Statins-Hmg-Coa Reductase Inhibitors Myalgia     Venom-Honey Bee Anaphylaxis   ,   Current Outpatient Prescriptions      Medication  Sig     amLODIPine (NORVASC) 10 mg tablet TAKE 1 TABLET BY MOUTH ONCE DAILY     aspirin chewable 81 mg chewable tablet Take 1 tablet by mouth once daily with a meal.     carvedilol (COREG) 3.125 mg tablet TAKE 1 TABLET BY MOUTH TWICE DAILY     NITROSTAT 0.4 mg sublingual tablet      omeprazole (PRILOSEC) 20 mg Delayed-Release capsule Take 1 capsule by mouth once daily before a meal.     polyethylene glycol-electrolyte (NULYTELY) 420 gram solution Take 240 mL by mouth every 10 minutes.     traMADol (ULTRAM) 50 mg tablet TAKE 1 TABLET BY MOUTH THREE TIMES DAILY AS NEEDED FOR PAIN     VENTOLIN HFA 90 mcg/actuation inhaler INHALE 1 TO 2 PUFFS BY MOUTH EVERY 2 HOURS IF NEEDED FOR SHORTNESS OF BREATH OR WHEEZING     No current facility-administered medications for this visit.      Medications have been reviewed by me and are current to the best of my knowledge and ability. ,   Past Medical History:     Diagnosis  Date     Bladder cancer (HC)      CAD (coronary artery disease)     MI times 3      CKD (chronic kidney disease) stage 3, GFR 30-59 ml/min      Elevated hemoglobin (HC) 06/22/09    19.7, likely secondary to tobacco abuse.  Will consider further evaluation for polycythemia vera in August 2009 when patient follows up for complete physical.       Hyperlipidemia - Intolerant of Statins 2/12/2013     Hypertension      Lymphoma in remission      Peripheral neuropathy (HC)     felt to be related to chemo      Radiation-induced heart disease - Coronary Artery Disease 2/12/2013     Testicular cancer (HC)    ,   Patient Active Problem List       Diagnosis  Date Noted     Urothelial carcinoma (HCC) - of Bladder  06/14/2016     Laryngopharyngeal reflux disease  06/17/2015     Erectile dysfunction  06/17/2015     COPD (chronic obstructive pulmonary disease) (HC)  06/17/2015     Squamous papilloma of soft palate - Dx 8/2014 - [facility]  08/20/2014     POSSIBLE -- Radiation-induced heart disease - Coronary  Artery Disease  02/12/2013     Hyperlipidemia - Intolerant of Statins  02/12/2013     Hypertension       PERICARDIAL EFFUSION S/P STENT AND PERICARDIOCENTESIS  02/18/2011     ACP (advance care planning)  02/11/2011     Patient has identified Health Care Agent(s): No  Add Health Care Agents: No  Patient has Advance Care Plan Documents (Health Care Directive, POLST): No, Pt declined information..    Patient has identified Specific Treatment Preferences: No   Specific limits to treatment preferences NOT identified: ASSUME FULL TREATMENT.    2/11/11:  Pt states that his family knows his wishes but that he hasn't put them down formally in witting. He declined receiving written information at this time but stated that his cousin, Dr. Trevor Cisneros- Head of Emergency Services at [facility] would probably be his HCA although he hasn't talked with him about this responsibility to date.   Nohemi Suarez, [facility]...Pager: [phone]  : [phone]              CAD (coronary artery disease)  02/11/2011     -Level 1 STEMI 2/9/11 at [facility] - successful PCI BMSx2 of proximal LAD   - Following angio, developed cardiac tamponade from contained perforation, s/p pericardiocentesis with drain left in, total of 250 ccs removed, started on colchicine  - 5/6/11 cor angio for CP: patent stents  - Level 1 STEMI 2/2/2013 at  - [facility] - Due to late in-stent thrombosis with preserved EF of 50-55% - successful PTCA to the entire LAD; thrombectomy; IC Eptifibatide, and BMS x1 and JOSE ANTONIO x2 to the mid LAD with coronary thrombectomy 2/3/2013        CKD (chronic kidney disease) stage 3, GFR 30-59 ml/min       Tobacco dependency  02/09/2011     Testicular cancer (HC)  02/09/2011     Lymphoma in remission  02/09/2011     APHTHOUS ULCERS  11/11/2010   ,   Past Surgical History:      Procedure  Laterality Date     APPENDECTOMY       Bilateral cataract surgery   2006     CORONARY STENT PLACEMENT      Stents multiple occasions        CYSTOSCOPY      Bladder cancer resection       LYMPH NODE DISSECTION       ORCHIECTOMY Left      Right testicular orchiectomy      at [facility]      and   Social History       Substance Use Topics         Smoking status:   Former Smoker     Packs/day:  0.25     Years:  35.00     Types:  Cigarettes     Quit date:  1/2/2013     Smokeless tobacco:   Never Used     Alcohol use   No      Comment: occasional        REVIEW OF SYSTEMS:  Review of Systems   All other systems reviewed and are negative.      OBJECTIVE:  /86  Pulse 68  Wt 97.8 kg (215 lb 9.6 oz)  BMI 29.24 kg/m2    EXAM:   Physical Exam   Constitutional: He is well-developed, well-nourished, and in no distress. No distress.   Neurological: He is alert.   Skin: Skin is warm and dry. He is not diaphoretic.   Psychiatric: Affect normal.   Nursing note and vitals reviewed.      ASSESSMENT/PLAN:    ICD-10-CM    1. Urothelial carcinoma (HCC) - of Bladder C68.9    2. Chronic obstructive pulmonary disease, unspecified COPD type (HC) J44.9 fluticasone-salmeterol (ADVAIR HFA) 115-21 mcg/actuation inhaler   3. CKD (chronic kidney disease) stage 3, GFR 30-59 ml/min N18.3         Plan:  At the present time he appears to be stable. He will stay off of cigarettes. I encouraged him to use his Advair on the regular basis and the DuoNeb on an as-needed basis. If he has further troubles with fever or other issues he will return or leg me know as soon as possible. Otherwise his next follow-up will be at the [facility] towards the end of June for repeat cystoscopy.

## 2018-01-05 NOTE — NURSING NOTE
Patient Information     Patient Name MRN Sex Luis A Garcia [id number] Male 1960      Nursing Note by Gloria Fuchs LPN at 5/10/2017 10:20 AM     Author:  Gloria Fuchs LPN Service:  (none) Author Type:  NURS- Licensed Practical Nurse     Filed:  5/10/2017 10:43 AM Encounter Date:  5/10/2017 Status:  Signed     :  Gloria Fuchs LPN (NURS- Licensed Practical Nurse)            The patient is here today to be seen for a fever, fatigue, and nauseated.  Gloria Fuchs LPN......5/10/2017  10:24 AM

## 2018-01-05 NOTE — PROGRESS NOTES
Patient Information     Patient Name MRN Sex Luis A Garcia [id number] Male 1960      Progress Notes by Tin Fink MD at 5/10/2017 10:20 AM     Author:  Tin Fink MD Service:  (none) Author Type:  Physician     Filed:  5/10/2017 11:43 AM Encounter Date:  5/10/2017 Status:  Signed     :  Tin Fink MD (Physician)            SUBJECTIVE:    Luis A Gonzales is a 57 y.o. male who presents for fever.    HPI Comments: He is here today with an acute illness. He has had a fever for the last 48 hours. This has been as high as 102 . He does have somewhat of a cough with this as well. He does not have any urine symptoms. His appetite is poor and he does have a little bit of nausea but otherwise no GI symptoms. He does not have a sore throat. He does not have any sore ears or other symptoms. He has not had a rash. He's been using some over-the-counter medications for this. He is concerned because of the fever and the fact that he did have blood poisoning approximately one year ago when he had treatment following bladder cancer installation and irrigation. He has not had a recent tick bite, but he is out in the woods quite a bit. He has not had any recent travel. He is not aware of any definite exposures to anybody else.      Allergies     Allergen  Reactions     Statins-Hmg-Coa Reductase Inhibitors Myalgia     Venom-Honey Bee Anaphylaxis   ,   Current Outpatient Prescriptions     Medication  Sig     amLODIPine (NORVASC) 10 mg tablet TAKE 1 TABLET BY MOUTH ONCE DAILY     aspirin chewable 81 mg chewable tablet Take 1 tablet by mouth once daily with a meal.     carvedilol (COREG) 3.125 mg tablet TAKE 1 TABLET BY MOUTH TWICE DAILY     NITROSTAT 0.4 mg sublingual tablet      omeprazole (PRILOSEC) 20 mg Delayed-Release capsule Take 1 capsule by mouth once daily before a meal.     polyethylene glycol-electrolyte (NULYTELY) 420 gram solution Take 240 mL by mouth every 10 minutes.     traMADol  (ULTRAM) 50 mg tablet TAKE 1 TABLET BY MOUTH THREE TIMES DAILY AS NEEDED FOR PAIN     VENTOLIN HFA 90 mcg/actuation inhaler INHALE 1 TO 2 PUFFS BY MOUTH EVERY 2 HOURS IF NEEDED FOR SHORTNESS OF BREATH OR WHEEZING     No current facility-administered medications for this visit.      Medications have been reviewed by me and are current to the best of my knowledge and ability. ,   Past Medical History:     Diagnosis  Date     Bladder cancer (HC)      CAD (coronary artery disease)     MI times 3      CKD (chronic kidney disease) stage 3, GFR 30-59 ml/min      Elevated hemoglobin () 06/22/09    19.7, likely secondary to tobacco abuse.  Will consider further evaluation for polycythemia vera in August 2009 when patient follows up for complete physical.       Hyperlipidemia - Intolerant of Statins 2/12/2013     Hypertension      Lymphoma in remission      Peripheral neuropathy (HC)     felt to be related to chemo      Radiation-induced heart disease - Coronary Artery Disease 2/12/2013     Testicular cancer (HC)    ,   Patient Active Problem List       Diagnosis  Date Noted     Urothelial carcinoma (HCC) - of Bladder  06/14/2016     Laryngopharyngeal reflux disease  06/17/2015     Erectile dysfunction  06/17/2015     COPD (chronic obstructive pulmonary disease) (HC)  06/17/2015     Squamous papilloma of soft palate - Dx 8/2014 - [facility]  08/20/2014     POSSIBLE -- Radiation-induced heart disease - Coronary Artery Disease  02/12/2013     Hyperlipidemia - Intolerant of Statins  02/12/2013     Hypertension       PERICARDIAL EFFUSION S/P STENT AND PERICARDIOCENTESIS  02/18/2011     ACP (advance care planning)  02/11/2011     Patient has identified Health Care Agent(s): No  Add Health Care Agents: No  Patient has Advance Care Plan Documents (Health Care Directive, POLST): No, Pt declined information..    Patient has identified Specific Treatment Preferences: No   Specific limits to treatment preferences NOT identified:  ASSUME FULL TREATMENT.    2/11/11:  Pt states that his family knows his wishes but that he hasn't put them down formally in witting. He declined receiving written information at this time but stated that his cousin, Dr. Trevor Cisneros- Head of Emergency Services at [facility] would probably be his HCA although he hasn't talked with him about this responsibility to date.   Nohemi Suarez, [facility] Center[facility]...Pager: [phone]  : [phone]              CAD (coronary artery disease)  02/11/2011     -Level 1 STEMI 2/9/11 at [facility] - successful PCI BMSx2 of proximal LAD   - Following angio, developed cardiac tamponade from contained perforation, s/p pericardiocentesis with drain left in, total of 250 ccs removed, started on colchicine  - 5/6/11 cor angio for CP: patent stents  - Level 1 STEMI 2/2/2013 at [facility] - Due to late in-stent thrombosis with preserved EF of 50-55% - successful PTCA to the entire LAD; thrombectomy; IC Eptifibatide, and BMS x1 and JOSE ANTONIO x2 to the mid LAD with coronary thrombectomy 2/3/2013        CKD (chronic kidney disease) stage 3, GFR 30-59 ml/min       Tobacco dependency  02/09/2011     Testicular cancer (HC)  02/09/2011     Lymphoma in remission  02/09/2011     APHTHOUS ULCERS  11/11/2010    and   Past Surgical History:      Procedure  Laterality Date     APPENDECTOMY       Bilateral cataract surgery   2006     CORONARY STENT PLACEMENT      Stents multiple occasions       CYSTOSCOPY      Bladder cancer resection       LYMPH NODE DISSECTION       ORCHIECTOMY Left      Right testicular orchiectomy      at [facility]         REVIEW OF SYSTEMS:  Review of Systems   All other systems reviewed and are negative.      OBJECTIVE:  /92  Pulse 88  Temp (!) 101.1  F (38.4  C) (Tympanic)  Wt 99.3 kg (219 lb)  SpO2 92%  BMI 29.7 kg/m2    EXAM:   Physical Exam   Constitutional:   He looks uncomfortable. He has a harsh nonproductive cough   HENT:   Head: Normocephalic.   Right Ear:  External ear normal.   Left Ear: External ear normal.   Mouth/Throat: No oropharyngeal exudate.   Minimal oropharyngeal erythema   Eyes: Pupils are equal, round, and reactive to light.   Neck: Normal range of motion. Neck supple. No JVD present. No tracheal deviation present. No thyromegaly present.   Cardiovascular: Normal rate and regular rhythm.    Pulmonary/Chest: He has decreased breath sounds. He has no wheezes. He has rhonchi. He has no rales.   Abdominal: Soft. There is no tenderness.   Lymphadenopathy:     He has no cervical adenopathy.   Neurological: He is alert.   Skin: Skin is warm and dry.   Psychiatric: Affect normal.   Nursing note and vitals reviewed.      ASSESSMENT/PLAN:    ICD-10-CM    1. Fever, unspecified fever cause R50.9 INFLUENZA ANTIGEN A & B      CBC WITH DIFFERENTIAL      BASIC METABOLIC PANEL      BLOOD CULTURE      INFLUENZA ANTIGEN A & B      CBC WITH DIFFERENTIAL      BASIC METABOLIC PANEL      BLOOD CULTURE      CBC WITH AUTO DIFFERENTIAL      doxycycline (VIBRAMYCIN) 100 mg tablet      LYME SCREEN W/REFLEX      ANAPLASMA   2. Cough R05 XR CHEST 2 VIEWS PA AND LATERAL        Plan:  His labs thus far is negative including testing for influenza. His chest x-ray is negative. With his persistent fever and possible tick exposure we do need to consider Lyme anaplasmosis. I elected to put him on doxycycline twice daily for 10 days. I will let him know the results of his pending lab which includes the 2 studies above as well as blood culture. He will need to return if he worsens. Plenty of rest and fluids to avoid dehydration. Again, follow-up and further testing will be dependent on pending results and his overall course going forward.

## 2018-01-05 NOTE — TELEPHONE ENCOUNTER
Patient Information     Patient Name MRN Sex Luis A Garcia [id number] Male 1960      Telephone Encounter by Gloria Fuchs LPN at 2017  3:11 PM     Author:  Gloria Fuchs LPN Service:  (none) Author Type:  NURS- Licensed Practical Nurse     Filed:  2017  3:13 PM Encounter Date:  2017 Status:  Signed     :  Gloria Fuchs LPN (NURS- Licensed Practical Nurse)            The patient was calling on lab results. This is a duplicate note.  Gloria Fuchs LPN......2017  3:13 PM

## 2018-01-05 NOTE — NURSING NOTE
Patient Information     Patient Name MRN Sex Luis A Garcia [id number] Male 1960      Nursing Note by Gloria Fuchs LPN at 2017 10:00 AM     Author:  Gloria Fuchs LPN Service:  (none) Author Type:  NURS- Licensed Practical Nurse     Filed:  2017 10:50 AM Encounter Date:  2017 Status:  Signed     :  Gloria Fuchs LPN (NURS- Licensed Practical Nurse)            The patient is here today to have a hospital follow up from the [facility].  Gloria Fuchs LPN......2017  10:36 AM

## 2018-01-25 ENCOUNTER — COMMUNICATION - GICH (OUTPATIENT)
Dept: INTERNAL MEDICINE | Facility: OTHER | Age: 58
End: 2018-01-25

## 2018-01-27 VITALS
HEIGHT: 72 IN | WEIGHT: 216 LBS | WEIGHT: 226.2 LBS | BODY MASS INDEX: 30.64 KG/M2 | HEART RATE: 72 BPM | DIASTOLIC BLOOD PRESSURE: 82 MMHG | DIASTOLIC BLOOD PRESSURE: 72 MMHG | SYSTOLIC BLOOD PRESSURE: 116 MMHG | BODY MASS INDEX: 29.26 KG/M2 | HEART RATE: 60 BPM | HEIGHT: 72 IN | SYSTOLIC BLOOD PRESSURE: 114 MMHG

## 2018-01-27 VITALS
TEMPERATURE: 101.1 F | OXYGEN SATURATION: 92 % | BODY MASS INDEX: 29.7 KG/M2 | WEIGHT: 219 LBS | DIASTOLIC BLOOD PRESSURE: 92 MMHG | HEART RATE: 88 BPM | SYSTOLIC BLOOD PRESSURE: 122 MMHG

## 2018-01-27 VITALS — DIASTOLIC BLOOD PRESSURE: 86 MMHG | HEART RATE: 68 BPM | SYSTOLIC BLOOD PRESSURE: 128 MMHG | WEIGHT: 215.6 LBS

## 2018-01-30 ASSESSMENT — PATIENT HEALTH QUESTIONNAIRE - PHQ9: SUM OF ALL RESPONSES TO PHQ QUESTIONS 1-9: 0

## 2018-02-04 ENCOUNTER — COMMUNICATION - GICH (OUTPATIENT)
Dept: FAMILY MEDICINE | Facility: OTHER | Age: 58
End: 2018-02-04

## 2018-02-04 DIAGNOSIS — Z20.828 CONTACT WITH AND (SUSPECTED) EXPOSURE TO OTHER VIRAL COMMUNICABLE DISEASES: ICD-10-CM

## 2018-02-12 NOTE — TELEPHONE ENCOUNTER
Patient Information     Patient Name MRN Sex Luis A Garcia [id number] Male 1960      Telephone Encounter by Dinesh Romero RN at 2017  4:55 PM     Author:  Dinesh Romero RN Service:  (none) Author Type:  NURS- Registered Nurse     Filed:  2017  5:09 PM Encounter Date:  2017 Status:  Signed     :  Dinesh Romero RN (NURS- Registered Nurse)            Writer was contacted by call center. Per call center, patient is very upset. States that patient has attempted to get a refill today from Dr. Fink for his tramadol, and that rx was refused. Call center is requesting that this writer speak to patient. Writer accepted soft transfer. Patient reports the same as noted above. Is upset as he states that his bladder cancer has returned, and that he is in need of a refill of his tramadol. Chart review shows that rx for tramadol was discontinued in patient's chart as of 10/18/17 by PCP for reason of none. Writer is unable to find that rx has been requested from pharmacy however. Patient still would like a refill, and would like rx request to be sent to his PCP. Writer stated to patient he could do this for him. Patient states he takes:     Tramadol 50 mg tablets-take 1 tablet four times daily as needed. States that rx is ordered for bladder cancer.    Writer will addison up rx as requested by patient. Writer did advise patient that rx request may not be cared for until the morning as Dr. Fink may have left for the day. Patient states understanding. Is ok with this as long as refill request is sent to PCP. Writer will do as requested. Did addison up rx as requested and reported per patient as noted above. Patient happy with plan of care. Writer will route rx request to PCP at this time.    Unable to complete prescription refill per RN Medication Refill Policy.................... Dinesh Romero RN ....................  2017   5:09 PM

## 2018-02-12 NOTE — TELEPHONE ENCOUNTER
Patient Information     Patient Name MRN Sex Luis A Garcia [id number] Male 1960      Telephone Encounter by Radha Dowd RN at 2017 12:03 PM     Author:  Radha Dowd RN Service:  (none) Author Type:  NURS- Registered Nurse     Filed:  2017 12:05 PM Encounter Date:  2017 Status:  Signed     :  Radha Dowd RN (NURS- Registered Nurse)            Bronchodilator Inhalers     Office visit in the past 12 months.    Last visit with ALYSSA KELLY was on: 10/18/2017 in [facility]  Next visit with ALYSSA KELLY is on: No future appointment listed with this provider  Next visit with Internal Medicine is on: No future appointment listed in this department    Max refills 12 months from last office visit.  Prescription refilled per RN Medication Refill Policy.................... RADHA DOWD, YARIEL ....................  2017   12:04 PM

## 2018-02-13 ENCOUNTER — DOCUMENTATION ONLY (OUTPATIENT)
Dept: FAMILY MEDICINE | Facility: OTHER | Age: 58
End: 2018-02-13

## 2018-02-13 PROBLEM — I10 HYPERTENSION: Status: ACTIVE | Noted: 2018-02-13

## 2018-02-13 PROBLEM — N18.30 CKD (CHRONIC KIDNEY DISEASE) STAGE 3, GFR 30-59 ML/MIN (H): Status: ACTIVE | Noted: 2018-02-13

## 2018-02-13 RX ORDER — NITROGLYCERIN 0.4 MG/1
1 TABLET SUBLINGUAL EVERY 5 MIN PRN
COMMUNITY
Start: 2016-06-10

## 2018-02-13 RX ORDER — TRAMADOL HYDROCHLORIDE 50 MG/1
50 TABLET ORAL 4 TIMES DAILY PRN
COMMUNITY
Start: 2017-12-20 | End: 2018-09-04

## 2018-02-13 RX ORDER — AMLODIPINE BESYLATE 10 MG/1
10 TABLET ORAL DAILY
COMMUNITY
Start: 2017-08-16 | End: 2018-06-14

## 2018-02-13 RX ORDER — IPRATROPIUM BROMIDE AND ALBUTEROL SULFATE 2.5; .5 MG/3ML; MG/3ML
3 SOLUTION RESPIRATORY (INHALATION) 4 TIMES DAILY
COMMUNITY
Start: 2017-11-14 | End: 2020-01-21

## 2018-02-13 RX ORDER — CARVEDILOL 3.12 MG/1
3.12 TABLET ORAL 2 TIMES DAILY
COMMUNITY
Start: 2017-04-26 | End: 2018-04-24

## 2018-02-13 RX ORDER — ASPIRIN 81 MG/1
81 TABLET, CHEWABLE ORAL
COMMUNITY

## 2018-02-13 RX ORDER — ALBUTEROL SULFATE 90 UG/1
1-2 AEROSOL, METERED RESPIRATORY (INHALATION)
COMMUNITY
Start: 2017-12-05 | End: 2018-12-13

## 2018-02-13 NOTE — TELEPHONE ENCOUNTER
Patient Information     Patient Name MRN Sex Luis A Garcia [id number] Male 1960      Telephone Encounter by Tin Fink MD at 2018  6:39 AM     Author:  Tin Fink MD Service:  (none) Author Type:  Physician     Filed:  2018  6:40 AM Encounter Date:  2018 Status:  Signed     :  Tin Fink MD (Physician)            1:20 PM today. If he feels that he needs to be seen sooner, [facility] can [facility].

## 2018-02-13 NOTE — TELEPHONE ENCOUNTER
Patient Information     Patient Name MRN Sex Luis A Garcia [id number] Male 1960      Telephone Encounter by Cheryl Koenig at 2018  4:58 PM     Author:  Cheryl Koenig Service:  (none) Author Type:  (none)     Filed:  2018  5:00 PM Encounter Date:  2018 Status:  Signed     :  Cheryl Koenig            MAF - Patient requesting a work in [employer] tomorrow 18. Patient just finished treatment at [facility] and is not feeling well. [place] wants patient to be seen for flu swab.  Please call.    Cheryl Koenig ....................  2018   5:00 PM

## 2018-02-13 NOTE — TELEPHONE ENCOUNTER
Patient Information     Patient Name MRN Sex Luis A Garcia [id number] Male 1960      Telephone Encounter by Gloria Fuchs LPN at 2018  9:59 AM     Author:  Gloria Fuchs LPN Service:  (none) Author Type:  NURS- Licensed Practical Nurse     Filed:  2018  9:59 AM Encounter Date:  2018 Status:  Signed     :  Gloria Fuchs LPN (NURS- Licensed Practical Nurse)            The patient called back and stated he would attend that 1:20pm appointment.  Gloria Fuchs LPN......2018  9:59 AM

## 2018-02-13 NOTE — TELEPHONE ENCOUNTER
Patient Information     Patient Name MRN Sex Luis A Garcia [id number] Male 1960      Telephone Encounter by Evita Barbosa at 2018  5:50 PM     Author:  Evita Barbosa Service:  (none) Author Type:  (none)     Filed:  2018  5:51 PM Encounter Date:  2018 Status:  Signed     :  Evita Barbosa            Significant other positive for Influenza A. Requesting Tamiflu for this patient to [facility] please.   Evita Barbosa LPN...................2018   5:51 PM

## 2018-02-13 NOTE — TELEPHONE ENCOUNTER
Patient Information     Patient Name MRN Sex Luis A Gacria [id number] Male 1960      Telephone Encounter by Gloria Fuchs LPN at 2018  7:56 AM     Author:  Gloria Fuchs LPN Service:  (none) Author Type:  NURS- Licensed Practical Nurse     Filed:  2018  7:56 AM Encounter Date:  2018 Status:  Signed     :  Gloria Fuchs LPN (NURS- Licensed Practical Nurse)            Left a message for the patient to call back and confirm the appointment time given below.   Gloria Fuchs LPN......2018  7:56 AM

## 2018-04-24 DIAGNOSIS — I10 ESSENTIAL (PRIMARY) HYPERTENSION: Primary | ICD-10-CM

## 2018-04-27 RX ORDER — CARVEDILOL 3.12 MG/1
TABLET ORAL
Qty: 180 TABLET | Refills: 3 | Status: SHIPPED | OUTPATIENT
Start: 2018-04-27 | End: 2019-04-26

## 2018-05-16 ENCOUNTER — OFFICE VISIT (OUTPATIENT)
Dept: INTERNAL MEDICINE | Facility: OTHER | Age: 58
End: 2018-05-16
Attending: INTERNAL MEDICINE
Payer: COMMERCIAL

## 2018-05-16 VITALS
BODY MASS INDEX: 30.16 KG/M2 | WEIGHT: 222.4 LBS | SYSTOLIC BLOOD PRESSURE: 122 MMHG | DIASTOLIC BLOOD PRESSURE: 84 MMHG | HEART RATE: 68 BPM

## 2018-05-16 DIAGNOSIS — R42 VERTIGO: Primary | ICD-10-CM

## 2018-05-16 DIAGNOSIS — N18.30 CKD (CHRONIC KIDNEY DISEASE) STAGE 3, GFR 30-59 ML/MIN (H): ICD-10-CM

## 2018-05-16 DIAGNOSIS — C68.9 UROTHELIAL CARCINOMA (H): ICD-10-CM

## 2018-05-16 DIAGNOSIS — C62.10 MALIGNANT NEOPLASM OF DESCENDED TESTIS, UNSPECIFIED LATERALITY (H): ICD-10-CM

## 2018-05-16 PROCEDURE — G0463 HOSPITAL OUTPT CLINIC VISIT: HCPCS

## 2018-05-16 PROCEDURE — 99214 OFFICE O/P EST MOD 30 MIN: CPT | Performed by: INTERNAL MEDICINE

## 2018-05-16 ASSESSMENT — ENCOUNTER SYMPTOMS
ALLERGIC/IMMUNOLOGIC NEGATIVE: 1
CONSTITUTIONAL NEGATIVE: 1
HEMATOLOGIC/LYMPHATIC NEGATIVE: 1
ENDOCRINE NEGATIVE: 1

## 2018-05-16 NOTE — MR AVS SNAPSHOT
"              After Visit Summary   2018    Luis A Gonzales    MRN: [id number]           Patient Information     Date Of Birth          1960        Visit Information        Provider Department      2018 7:40 AM Tin Fink MD [facility]        Today's Diagnoses     Vertigo    -  1    CKD (chronic kidney disease) stage 3, GFR 30-59 ml/min        Malignant neoplasm of descended testis, unspecified laterality (H)        Urothelial carcinoma (H)           Follow-ups after your visit        Who to contact     If you have questions or need follow up information about today's clinic visit or your schedule please contact [facility] directly at [phone].  Normal or non-critical lab and imaging results will be communicated to you by [employer], letter or phone within 4 business days after the clinic has received the results. If you do not hear from us within 7 days, please contact the clinic through [employer] or phone. If you have a critical or abnormal lab result, we will notify you by phone as soon as possible.  Submit refill requests through iLink or call your pharmacy and they will forward the refill request to us. Please allow 3 business days for your refill to be completed.          Additional Information About Your Visit        MyChart Information     iLink lets you send messages to your doctor, view your test results, renew your prescriptions, schedule appointments and more. To sign up, go to [email or web address] . Click on \"Log in\" on the left side of the screen, which will take you to the Welcome page. Then click on \"Sign up Now\" on the right side of the page.     You will be asked to enter the access code listed below, as well as some personal information. Please follow the directions to create your username and password.     Your access code is: [email or web address]  Expires: 2018  8:07 AM     Your access code will  in 90 days. If you need " help or a new code, please call your [facility] clinic or [phone].        Care EveryWhere ID     This is your Care EveryWhere ID. This could be used by other organizations to access your [facility] medical records  [id number]        Your Vitals Were     Pulse BMI (Body Mass Index)                68 30.16 kg/m2           Blood Pressure from Last 3 Encounters:   05/16/18 122/84   10/18/17 114/82   05/23/17 128/86    Weight from Last 3 Encounters:   05/16/18 222 lb 6.4 oz (100.9 kg)   10/18/17 226 lb 3.2 oz (102.6 kg)   05/23/17 215 lb 9.6 oz (97.8 kg)              Today, you had the following     No orders found for display       Primary Care Provider Office Phone # Fax #    Tin Fink [phone] [address] [facility] [address]        Equal Access to Services     CRISTIAN [facility] Center[facility] : Hadii aad ku hadasho Soomaali, waaxda luqadaha, qaybta kaalmada adeegyada, amanda garcias . So [facility] [place] [facility] [phone].    ATENCIÓN: Si cece pearce, tiene a alvraes disposición servicios gratuitos de asistencia lingüística. Llame al [phone].    We comply with applicable federal civil rights laws and [place] laws. We do not discriminate on the basis of race, color, national origin, age, disability, sex, sexual orientation, or gender identity.            Thank you!     Thank you for choosing [facility] AND [facility]  for your care. Our goal is always to provide you with excellent care. Hearing back from our patients is one way we can continue to improve our services. Please take a few minutes to complete the written survey that you may receive in the mail after your visit with us. Thank you!             Your Updated Medication List - Protect others around you: Learn how to safely use, store and throw away your medicines at www.disposemymeds.org.          This list is accurate as of 5/16/18  8:07 AM.  Always use your most recent med list.                   Brand Name  Dispense Instructions for use Diagnosis    amLODIPine 10 MG tablet    NORVASC     Take 10 mg by mouth daily        aspirin 81 MG chewable tablet      Take 81 mg by mouth daily with food        carvedilol 3.125 MG tablet    COREG    180 tablet    TAKE 1 TABLET BY MOUTH TWICE DAILY    Essential (primary) hypertension       ipratropium - albuterol 0.5 mg/2.5 mg/3 mL 0.5-2.5 (3) MG/3ML neb solution    DUONEB     Inhale 3 mLs into the lungs 4 times daily        NITROSTAT 0.4 MG sublingual tablet   Generic drug:  nitroGLYcerin      Place 1 tablet under the tongue every 5 minutes as needed Max dose of 3 pills per episode.        omeprazole 20 MG CR capsule    priLOSEC     Take 20 mg by mouth daily with food        traMADol 50 MG tablet    ULTRAM     Take 50 mg by mouth 4 times daily as needed        VENTOLIN  (90 Base) MCG/ACT Inhaler   Generic drug:  albuterol      Inhale 1-2 puffs into the lungs every 2 hours as needed

## 2018-05-16 NOTE — NURSING NOTE
The patient is here today to be seen for some dizziness that started 5-7-2018.  Gloria Fuchs LPN on 5/16/2018 at 7:45 AM

## 2018-05-16 NOTE — PROGRESS NOTES
Chief Complaint:  Dizzyness.    HPI: He comes in today with complaint of dizziness.  This is something that was especially bad a week ago.  He thinks it was just because he was having so much pain from doing so much garden work, etc.  He was quite dizzy and had a hard time standing on occasion, but did not fall or otherwise injure himself.  With time, his dizziness has improved and currently he is actually feeling much better and has only minimal problems with the dizziness.  He had no other focal neurologic deficits associated with this.    His past medical history is remarkable for bladder and testicular cancer.  This was reviewed and updated today.  He does continue to follow-up at the [facility] on both of these on a regular basis.    Medications are reconciled.  Past medical history, past surgical history, family history and social histories are reviewed and updated.    Past Medical History:   Diagnosis Date     Atherosclerotic heart disease of native coronary artery without angina pectoris     MI times 3     Chronic kidney disease, stage III (moderate)     No Comments Provided     COPD (chronic obstructive pulmonary disease) (H)      Essential (primary) hypertension     No Comments Provided     Hyperlipidemia     2/12/2013     Malignant neoplasm of bladder (H)     No Comments Provided     Malignant neoplasm of testis (H)     No Comments Provided     Non-Hodgkin lymphoma (H)     No Comments Provided     Other hemoglobinopathies (H)     06/22/09,19.7, likely secondary to tobacco abuse.  Will consider further evaluation for polycythemia vera in August 2009 when patient follows up for complete physical.     Polyneuropathy     felt to be related to chemo       Past Surgical History:   Procedure Laterality Date     APPENDECTOMY OPEN      No Comments Provided     CATARACT IOL, RT/LT Bilateral      CYSTOSCOPY      Bladder cancer resection     HEART CATH, ANGIOPLASTY      Stents multiple occasions     ORCHIECTOMY  SCROTAL Bilateral        Allergies   Allergen Reactions     Bee Venom Anaphylaxis     Hmg-Coa-R Inhibitors Muscle Pain (Myalgia)       Current Outpatient Prescriptions   Medication Sig Dispense Refill     albuterol (VENTOLIN HFA) 108 (90 BASE) MCG/ACT Inhaler Inhale 1-2 puffs into the lungs every 2 hours as needed       amLODIPine (NORVASC) 10 MG tablet Take 10 mg by mouth daily       aspirin 81 MG chewable tablet Take 81 mg by mouth daily with food       carvedilol (COREG) 3.125 MG tablet TAKE 1 TABLET BY MOUTH TWICE DAILY 180 tablet 3     ipratropium - albuterol 0.5 mg/2.5 mg/3 mL (DUONEB) 0.5-2.5 (3) MG/3ML neb solution Inhale 3 mLs into the lungs 4 times daily       nitroGLYcerin (NITROSTAT) 0.4 MG sublingual tablet Place 1 tablet under the tongue every 5 minutes as needed Max dose of 3 pills per episode.       omeprazole (PRILOSEC) 20 MG CR capsule Take 20 mg by mouth daily with food       traMADol (ULTRAM) 50 MG tablet Take 50 mg by mouth 4 times daily as needed         Social History     Social History     Marital status: Single     Spouse name: N/A     Number of children: N/A     Years of education: N/A     Occupational History     Not on file.     Social History Main Topics     Smoking status: Former Smoker     Packs/day: 0.25     Years: 35.00     Types: Cigarettes     Quit date: 1/2/2013     Smokeless tobacco: Never Used     Alcohol use No      Comment: Alcoholic Drinks/day: occasional     Drug use: Not on file      Comment: Drug use: No     Sexual activity: Not on file     Other Topics Concern     Not on file     Social History Narrative    Has significant other times 18 years, lives in town.  Not employed.       Review of Systems   Constitutional: Negative.    Endocrine: Negative.    Skin: Negative.    Allergic/Immunologic: Negative.    Hematological: Negative.        Physical Exam   Constitutional: He is oriented to person, place, and time. He appears well-developed and well-nourished. No distress.    Eyes:   Minimal nystagmus with lateral gaze   Neck: Normal carotid pulses present. Carotid bruit is not present.   Cardiovascular: Normal rate, regular rhythm and normal heart sounds.    Pulmonary/Chest: He has decreased breath sounds. He has no wheezes. He has no rhonchi. He has no rales.   Neurological: He is alert and oriented to person, place, and time. No cranial nerve deficit. He exhibits normal muscle tone. Coordination normal.   Romberg negative.  Finger-nose-finger testing negative.   Skin: He is not diaphoretic.   Nursing note and vitals reviewed.      Assessment:      ICD-10-CM    1. Vertigo R42    2. CKD (chronic kidney disease) stage 3, GFR 30-59 ml/min N18.3    3. Malignant neoplasm of descended testis, unspecified laterality (H) C62.10    4. Urothelial carcinoma (H) C68.9        Plan: This is likely an inner ear process causing the vertigo.  However, with his cancer history, the possibility of this representing metastatic disease needs to be considered.  I do not find any evidence for ischemic insult.  This was reviewed with the patient today.  I presented options of proceeding immediately with scanning of the brain versus waiting.  He was opting for the latter due to the cost.  I think that is fine.  If his symptoms persist or worsen, he will call and we will get him scheduled for an MRI of the brain without contrast due to his renal insufficiency.  Assuming that he improves, he will follow-up on an as-needed basis.

## 2018-05-17 ASSESSMENT — PATIENT HEALTH QUESTIONNAIRE - PHQ9: SUM OF ALL RESPONSES TO PHQ QUESTIONS 1-9: 0

## 2018-06-14 DIAGNOSIS — I10 HTN (HYPERTENSION): ICD-10-CM

## 2018-06-14 DIAGNOSIS — I10 ESSENTIAL HYPERTENSION: Primary | ICD-10-CM

## 2018-06-14 RX ORDER — AMLODIPINE BESYLATE 10 MG/1
TABLET ORAL
Qty: 90 TABLET | Refills: 0 | Status: SHIPPED | OUTPATIENT
Start: 2018-06-14 | End: 2018-09-12

## 2018-06-14 NOTE — TELEPHONE ENCOUNTER
This is a Refill request from: [facility]  Name of Medication and Dose:  amLODIPine (NORVASC) 10 mg tablet    Quantity requested:  90  Last fill date:8/16/2017  Last Office Visit:  5/16/2018  Narcotic Agreement Signed:  NA  PCP:  Tin Fink MD  Associated Diagnosis: Hypertension    Sania Lomeli LPN Supervisor 6/14/2018   10:30 AM

## 2018-07-23 NOTE — PROGRESS NOTES
Patient Information     Patient Name  Luis A Gonzales MRN  [id number] Sex  Male   1960      Letter by Angel Flores MD at      Author:  Angel Flores MD Service:  (none) Author Type:  (none)    Filed:   Encounter Date:  2017 Status:  (Other)           Luis A Gonzales  [address] [facility] [address]          2017    Dear Mr. Gonzales:    A refill of   amLODIPine (NORVASC) 10 mg tablet has been called into your pharmacy.    Additional refills require an appointment with Tin Fink MD  Please call the clinic at [phone] to schedule your appointment.    Thank you,    The Refill Nurse  [facility]

## 2018-07-23 NOTE — PROGRESS NOTES
Patient Information     Patient Name  Luis A Gonzales MRN  [id number] Sex  Male   1960      Letter by Tin Fink MD at      Author:  Tin Fink MD Service:  (none) Author Type:  (none)    Filed:   Encounter Date:  2017 Status:  (Other)           Luis A Gonzales  [address]  [facility] [address]          2017    Dear Luis A,    Following are the tests completed during your last clinic visit:    Results for orders placed or performed in visit on 17      COMPLETE METABOLIC PANEL      Result  Value Ref Range    SODIUM 141 133 - 143 mmol/L    POTASSIUM 5.0 3.5 - 5.1 mmol/L    CHLORIDE 101 98 - 107 mmol/L    CO2,TOTAL 27 21 - 31 mmol/L    ANION GAP 13 5 - 18                    GLUCOSE 94 70 - 105 mg/dL    CALCIUM 9.9 8.6 - 10.3 mg/dL    BUN 39 (H) 7 - 25 mg/dL    CREATININE 1.61 (H) 0.70 - 1.30 mg/dL    BUN/CREAT RATIO           24                    GFR if African American 54 (L) >60 ml/min/1.73m2    GFR if not African American 44 (L) >60 ml/min/1.73m2    ALBUMIN 4.3 3.5 - 5.7 g/dL    PROTEIN,TOTAL 7.7 6.4 - 8.9 g/dL    GLOBULIN                  3.4 2.0 - 3.7 g/dL    A/G RATIO 1.3 1.0 - 2.0                    BILIRUBIN,TOTAL 0.5 0.3 - 1.0 mg/dL    ALK PHOSPHATASE 84 34 - 104 IU/L    ALT (SGPT) 11 7 - 52 IU/L    AST (SGOT) 18 13 - 39 IU/L   LIPID PANEL      Result  Value Ref Range    CHOLESTEROL,TOTAL 168 <200 mg/dL    TRIGLYCERIDES 134 <150 mg/dL    HDL CHOLESTEROL 48 23 - 92 mg/dL    NON-HDL CHOLESTEROL 120 <145 mg/dl    CHOL/HDL RATIO            3.50 <4.50                    LDL CHOLESTEROL 93 <100 mg/dL    PATIENT STATUS            FASTING                   CBC WITH AUTO DIFFERENTIAL      Result  Value Ref Range    WHITE BLOOD COUNT         8.7 4.5 - 11.0 thou/cu mm    RED BLOOD COUNT           5.50 4.30 - 5.90 mil/cu mm    HEMOGLOBIN                16.2 13.5 - 17.5 g/dL    HEMATOCRIT                50.7 37.0 - 53.0 %    MCV                       92 80 - 100 fL    MCH                        29.5 26.0 - 34.0 pg    MCHC                      32.0 32.0 - 36.0 g/dL    RDW                       11.9 11.5 - 15.5 %    PLATELET COUNT            260 140 - 440 thou/cu mm    MPV                       8.1 6.5 - 11.0 fL    NEUTROPHILS               64.2 42.0 - 72.0 %    LYMPHOCYTES               24.5 20.0 - 44.0 %    MONOCYTES                 6.6 <12.0 %    EOSINOPHILS               3.9 <8.0 %    BASOPHILS                 0.8 <3.0 %    ABSOLUTE NEUTROPHILS      5.6 1.7 - 7.0 thou/cu mm    ABSOLUTE LYMPHOCYTES      2.1 0.9 - 2.9 thou/cu mm    ABSOLUTE MONOCYTES        0.6 <0.9 thou/cu mm    ABSOLUTE EOSINOPHILS      0.3 <0.5 thou/cu mm    ABSOLUTE BASOPHILS        0.1 <0.3 thou/cu mm         Your blood tests look very good. You do have some weakness of your kidneys but this is remaining stable. Everything else looks great. Congratulations on this report. If you have any questions about your results, feel free to contact me.    Sincerely,      Tin Fink MD  Internal Medicine  [facility]

## 2018-09-04 DIAGNOSIS — C68.9 UROTHELIAL CARCINOMA (H): Primary | ICD-10-CM

## 2018-09-06 RX ORDER — TRAMADOL HYDROCHLORIDE 50 MG/1
TABLET ORAL
Qty: 30 TABLET | Refills: 0 | Status: SHIPPED | OUTPATIENT
Start: 2018-09-06 | End: 2020-01-21

## 2018-09-06 NOTE — TELEPHONE ENCOUNTER
Routing refill request to provider for review/approval because:  Drug not on the [facility] refill protocol     LOV: 5/16/18    Called patient to verify if he would have enough Tramadol until Monday when  returns.    Patient states he tries to only take them when he needs them so unsure if he will have enough.    Patient has urothelial and testicular cancer.    Will route to Angel Flores MD for review and consideration  Radha Dowd RN on 9/6/2018 at 2:47 PM

## 2018-09-06 NOTE — TELEPHONE ENCOUNTER
Called patient per Dr. Delaney to see if he could come into clinic today to see Dr. delaney and patient states he cant and will wait for Dr. Cartwright return on Monday.    Dr. Delaney informed and message sent to Dr. Fink for Monday.  Radha Dowd RN on 9/6/2018 at 4:08 PM

## 2018-09-06 NOTE — TELEPHONE ENCOUNTER
Rx printed x30 tablets. Please fax and notify patient.   Please have him schedule f/u with Dr. Fink before running out of medications.     Angel Flores MD

## 2018-09-07 NOTE — TELEPHONE ENCOUNTER
Patient returned call and was informed of Dr. Flores response below, patient verbalized understanding.    Patient very upset that he has to go through this every time he needs a refill on Tramadol which he only takes if he needs for his chronic/cancer pain.    Listened to patient and offer for patient to speak with our patient experience coordinator, patient states he just wanted to call and let me know why he didn't come in yesterday.    Patient fine at end of conversation.    Radha Dowd RN on 9/7/2018 at 8:52 AM

## 2018-09-12 DIAGNOSIS — I10 ESSENTIAL HYPERTENSION: ICD-10-CM

## 2018-09-14 RX ORDER — AMLODIPINE BESYLATE 10 MG/1
TABLET ORAL
Qty: 90 TABLET | Refills: 1 | Status: SHIPPED | OUTPATIENT
Start: 2018-09-14 | End: 2019-03-20

## 2018-09-14 NOTE — TELEPHONE ENCOUNTER
Routing refill request to provider for review/approval because:  Labs not current:  Creatinine  LOV; 5/16/18    Radha Dowd RN on 9/14/2018 at 10:33 AM

## 2018-10-16 DIAGNOSIS — K21.9 LPRD (LARYNGOPHARYNGEAL REFLUX DISEASE): Primary | ICD-10-CM

## 2018-10-19 NOTE — TELEPHONE ENCOUNTER
Omeprazole  LOV-05/16/2018  Prescription refilled per RN Medication RefillPolicy.................... Yisel Diez .Silas..................  10/19/2018   2:42 PM

## 2018-12-13 DIAGNOSIS — J44.9 CHRONIC OBSTRUCTIVE PULMONARY DISEASE, UNSPECIFIED COPD TYPE (H): Primary | ICD-10-CM

## 2018-12-17 RX ORDER — ALBUTEROL SULFATE 90 UG/1
AEROSOL, METERED RESPIRATORY (INHALATION)
Qty: 3 INHALER | Refills: 1 | Status: SHIPPED | OUTPATIENT
Start: 2018-12-17 | End: 2020-01-21

## 2018-12-17 NOTE — TELEPHONE ENCOUNTER
Routing refill request to provider for review/approval because:  Medication is reported/historical    LOV: 5/16/18  Radha Dowd RN on 12/17/2018 at 10:26 AM

## 2019-03-19 ENCOUNTER — TELEPHONE (OUTPATIENT)
Dept: INTERNAL MEDICINE | Facility: OTHER | Age: 59
End: 2019-03-19

## 2019-03-19 NOTE — TELEPHONE ENCOUNTER
The patient was contacted and notified that a ventolin prescription was sent in to his pharmacy on 12-. He will recall his pharmacy and see what is going on  Gloria Fuchs LPN on 3/19/2019 at 9:05 AM

## 2019-03-19 NOTE — TELEPHONE ENCOUNTER
Pt insurance will not cover albuterol any more, pt wants to switch to ventolin if MAF thinks it will work.

## 2019-03-20 DIAGNOSIS — I10 ESSENTIAL HYPERTENSION: ICD-10-CM

## 2019-03-22 RX ORDER — AMLODIPINE BESYLATE 10 MG/1
TABLET ORAL
Qty: 90 TABLET | Refills: 0 | Status: SHIPPED | OUTPATIENT
Start: 2019-03-22 | End: 2020-02-11

## 2019-03-22 NOTE — TELEPHONE ENCOUNTER
Routing refill request to provider for review/approval because:  Labs not current:  Creatinine    LOV: 5/16/18  Radha Dowd RN on 3/22/2019 at 7:57 AM

## 2019-04-26 DIAGNOSIS — I10 ESSENTIAL (PRIMARY) HYPERTENSION: ICD-10-CM

## 2019-04-26 DIAGNOSIS — K21.9 LPRD (LARYNGOPHARYNGEAL REFLUX DISEASE): ICD-10-CM

## 2019-04-29 DIAGNOSIS — I10 ESSENTIAL (PRIMARY) HYPERTENSION: ICD-10-CM

## 2019-04-29 RX ORDER — CARVEDILOL 3.12 MG/1
TABLET ORAL
Qty: 60 TABLET | Refills: 0 | Status: SHIPPED | OUTPATIENT
Start: 2019-04-29 | End: 2020-01-21

## 2019-04-29 NOTE — TELEPHONE ENCOUNTER
"Requested Prescriptions   Pending Prescriptions Disp Refills     omeprazole (PRILOSEC) 20 MG DR capsule [Pharmacy Med Name: OMEPRAZOLE 20MG CAPSULES] 90 capsule 0     Sig: TAKE 1 CAPSULE BY MOUTH EVERY DAY       PPI Protocol Passed - 4/26/2019  8:30 AM        Passed - Not on Clopidogrel (unless Pantoprazole ordered)        Passed - No diagnosis of osteoporosis on record        Passed - Recent (12 mo) or future (30 days) visit within the authorizing provider's specialty     Patient had office visit in the last 12 months or has a visit in the next 30 days with authorizing provider or within the authorizing provider's specialty.  See \"Patient Info\" tab in inbasket, or \"Choose Columns\" in Meds & Orders section of the refill encounter.              Passed - Medication is active on med list        Passed - Patient is age 18 or older        LOV 5/16/18    Prescription approved per [facility] Refill Protocol.    "

## 2019-04-29 NOTE — TELEPHONE ENCOUNTER
"Requested Prescriptions   Pending Prescriptions Disp Refills     carvedilol (COREG) 3.125 MG tablet [Pharmacy Med Name: CARVEDILOL 3.125MG TABLETS] 180 tablet 0     Sig: TAKE 1 TABLET BY MOUTH TWICE DAILY       Beta-Blockers Protocol Passed - 4/29/2019  2:17 PM        Passed - Blood pressure under 140/90 in past 12 months     BP Readings from Last 3 Encounters:   05/16/18 122/84   10/18/17 114/82   05/23/17 128/86                 Passed - Patient is age 6 or older        Passed - Recent (12 mo) or future (30 days) visit within the authorizing provider's specialty     Patient had office visit in the last 12 months or has a visit in the next 30 days with authorizing provider or within the authorizing provider's specialty.  See \"Patient Info\" tab in inbasket, or \"Choose Columns\" in Meds & Orders section of the refill encounter.              Passed - Medication is active on med list        LOV 5/16/18    Prescription approved per [facility] Refill Protocol.    "

## 2019-05-01 RX ORDER — CARVEDILOL 3.12 MG/1
TABLET ORAL
Qty: 180 TABLET | Refills: 0 | OUTPATIENT
Start: 2019-05-01

## 2019-05-01 NOTE — TELEPHONE ENCOUNTER
Refill Request for: carvedilol (COREG) 3.125 MG tablet   Received From: [facility]   Last Written Prescription Date: 04/29/19 for 60 tablets and 0 refills  LOV: 05/16/18 with PCP  Next Appointment: No upcoming office visit on file during initial refill review  with PCP  Protocol: Beta-Blockers Protocol Passed- 5/1 3:47 PM      Refill request denied; too soon, last written prescription (04/29/19 for 180 tabs and 0 refills) should provide adequate medication supply.       Kecia Roberts RN on 5/1/2019 at 3:50 PM

## 2019-05-03 DIAGNOSIS — I10 ESSENTIAL (PRIMARY) HYPERTENSION: ICD-10-CM

## 2019-05-03 NOTE — LETTER
May 7, 2019      Luis A Gonzales  [address] [address]        A refill request was received from your pharmacy for Carvedilol.    Additional refills require an office visit with Dr. Fink for annual review.    Please call [phone] to schedule appointment.      Sincerely,      Refill Nurse

## 2019-05-07 RX ORDER — CARVEDILOL 3.12 MG/1
TABLET ORAL
Qty: 180 TABLET | Refills: 0 | OUTPATIENT
Start: 2019-05-07

## 2019-05-07 NOTE — TELEPHONE ENCOUNTER
Carvedilol refilled on 4/29/19  LOV; 5/16/18  Attempted to contact patient to inform due for annual review and no answer and unable to leave message.  Letter sent  Radha Dowd RN on 5/7/2019 at 9:57 AM

## 2020-01-21 ENCOUNTER — OFFICE VISIT (OUTPATIENT)
Dept: INTERNAL MEDICINE | Facility: OTHER | Age: 60
End: 2020-01-21
Attending: INTERNAL MEDICINE
Payer: COMMERCIAL

## 2020-01-21 VITALS
SYSTOLIC BLOOD PRESSURE: 118 MMHG | DIASTOLIC BLOOD PRESSURE: 80 MMHG | WEIGHT: 212.4 LBS | BODY MASS INDEX: 28.77 KG/M2 | HEIGHT: 72 IN | OXYGEN SATURATION: 93 % | RESPIRATION RATE: 20 BRPM | HEART RATE: 82 BPM | TEMPERATURE: 97.6 F

## 2020-01-21 DIAGNOSIS — J44.9 CHRONIC OBSTRUCTIVE PULMONARY DISEASE, UNSPECIFIED COPD TYPE (H): ICD-10-CM

## 2020-01-21 DIAGNOSIS — Z12.11 SPECIAL SCREENING FOR MALIGNANT NEOPLASMS, COLON: ICD-10-CM

## 2020-01-21 DIAGNOSIS — E78.5 HYPERLIPIDEMIA, UNSPECIFIED HYPERLIPIDEMIA TYPE: ICD-10-CM

## 2020-01-21 DIAGNOSIS — I25.10 ATHEROSCLEROSIS OF NATIVE CORONARY ARTERY OF NATIVE HEART WITHOUT ANGINA PECTORIS: ICD-10-CM

## 2020-01-21 DIAGNOSIS — I10 ESSENTIAL HYPERTENSION: Primary | ICD-10-CM

## 2020-01-21 LAB
ALBUMIN SERPL-MCNC: 4 G/DL (ref 3.5–5.7)
ALP SERPL-CCNC: 63 U/L (ref 34–104)
ALT SERPL W P-5'-P-CCNC: 13 U/L (ref 7–52)
ANION GAP SERPL CALCULATED.3IONS-SCNC: 11 MMOL/L (ref 3–14)
AST SERPL W P-5'-P-CCNC: 16 U/L (ref 13–39)
BILIRUB SERPL-MCNC: 0.3 MG/DL (ref 0.3–1)
BUN SERPL-MCNC: 26 MG/DL (ref 7–25)
CALCIUM SERPL-MCNC: 9.7 MG/DL (ref 8.6–10.3)
CHLORIDE SERPL-SCNC: 102 MMOL/L (ref 98–107)
CHOLEST SERPL-MCNC: 157 MG/DL
CO2 SERPL-SCNC: 26 MMOL/L (ref 21–31)
CREAT SERPL-MCNC: 1.85 MG/DL (ref 0.7–1.3)
ERYTHROCYTE [DISTWIDTH] IN BLOOD BY AUTOMATED COUNT: 13.2 % (ref 10–15)
GFR SERPL CREATININE-BSD FRML MDRD: 37 ML/MIN/{1.73_M2}
GLUCOSE SERPL-MCNC: 112 MG/DL (ref 70–105)
HCT VFR BLD AUTO: 44.3 % (ref 40–53)
HDLC SERPL-MCNC: 41 MG/DL (ref 23–92)
HGB BLD-MCNC: 14.1 G/DL (ref 13.3–17.7)
LDLC SERPL CALC-MCNC: 78 MG/DL
MCH RBC QN AUTO: 28.9 PG (ref 26.5–33)
MCHC RBC AUTO-ENTMCNC: 31.8 G/DL (ref 31.5–36.5)
MCV RBC AUTO: 91 FL (ref 78–100)
NONHDLC SERPL-MCNC: 116 MG/DL
PLATELET # BLD AUTO: 320 10E9/L (ref 150–450)
POTASSIUM SERPL-SCNC: 4.6 MMOL/L (ref 3.5–5.1)
PROT SERPL-MCNC: 7.3 G/DL (ref 6.4–8.9)
RBC # BLD AUTO: 4.88 10E12/L (ref 4.4–5.9)
SODIUM SERPL-SCNC: 139 MMOL/L (ref 134–144)
TRIGL SERPL-MCNC: 189 MG/DL
WBC # BLD AUTO: 9 10E9/L (ref 4–11)

## 2020-01-21 PROCEDURE — 80061 LIPID PANEL: CPT | Mod: ZL | Performed by: INTERNAL MEDICINE

## 2020-01-21 PROCEDURE — G0463 HOSPITAL OUTPT CLINIC VISIT: HCPCS

## 2020-01-21 PROCEDURE — 36415 COLL VENOUS BLD VENIPUNCTURE: CPT | Mod: ZL | Performed by: INTERNAL MEDICINE

## 2020-01-21 PROCEDURE — 99214 OFFICE O/P EST MOD 30 MIN: CPT | Performed by: INTERNAL MEDICINE

## 2020-01-21 PROCEDURE — 85027 COMPLETE CBC AUTOMATED: CPT | Mod: ZL | Performed by: INTERNAL MEDICINE

## 2020-01-21 PROCEDURE — 80053 COMPREHEN METABOLIC PANEL: CPT | Mod: ZL | Performed by: INTERNAL MEDICINE

## 2020-01-21 RX ORDER — FAMOTIDINE 20 MG/1
20 TABLET, FILM COATED ORAL 2 TIMES DAILY
COMMUNITY
Start: 2020-01-21 | End: 2020-06-25

## 2020-01-21 RX ORDER — ALBUTEROL SULFATE 90 UG/1
2 AEROSOL, METERED RESPIRATORY (INHALATION) EVERY 4 HOURS PRN
Qty: 3 INHALER | Refills: 3 | Status: SHIPPED | OUTPATIENT
Start: 2020-01-21 | End: 2020-09-04

## 2020-01-21 ASSESSMENT — ENCOUNTER SYMPTOMS
HEMATOLOGIC/LYMPHATIC NEGATIVE: 1
ENDOCRINE NEGATIVE: 1
ALLERGIC/IMMUNOLOGIC NEGATIVE: 1
CONSTITUTIONAL NEGATIVE: 1

## 2020-01-21 ASSESSMENT — MIFFLIN-ST. JEOR: SCORE: 1803.5

## 2020-01-21 ASSESSMENT — PAIN SCALES - GENERAL: PAINLEVEL: NO PAIN (0)

## 2020-01-21 NOTE — PROGRESS NOTES
Chief Complaint: Checkup.    HPI: He comes in today for 1 of his rare visits.  I have not seen him for almost 2 years.  He has a history of coronary artery disease.  He is not on statin therapy because he has an intolerance.  He is no longer taking his carvedilol.  He is taking aspirin therapy.  He is not having any chest pain or cardiac problems.  He also has a history of COPD.  He is supposed to be on Advair but he cannot afford that.  He needs a refill on his albuterol MDI.  He took a trip to [facility] this year and found that his breathing was fabulous there and he is thinking about actually moving there.    He has a history of cancer including bladder cancer and urologic cancer.  He does get regular follow-ups on this.  He has a history of hypertension and is currently just taking amlodipine 10 mg daily with good control of his blood pressure and no side effects.    Medications are reconciled.  Past medical history, past surgical history, social histories are reviewed and updated.  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  He is willing to have some lab work today.  He has never had any colon cancer screening.    Past Medical History:   Diagnosis Date     Atherosclerotic heart disease of native coronary artery without angina pectoris     MI times 3     Chronic kidney disease, stage III (moderate) (H)     No Comments Provided     COPD (chronic obstructive pulmonary disease) (H)      Essential (primary) hypertension     No Comments Provided     Hyperlipidemia     2/12/2013     Malignant neoplasm of bladder (H)     No Comments Provided     Malignant neoplasm of testis (H)     No Comments Provided     Non-Hodgkin lymphoma (H)     No Comments Provided     Polycythemia     06/22/09,19.7, likely secondary to tobacco abuse.  Will consider further evaluation for polycythemia vera in August 2009 when patient follows up for complete physical.     Polyneuropathy     felt to be related to chemo        Past Surgical History:   Procedure Laterality Date     APPENDECTOMY OPEN      No Comments Provided     CATARACT IOL, RT/LT Bilateral      CYSTOSCOPY      Bladder cancer resection     HEART CATH, ANGIOPLASTY      Stents multiple occasions     ORCHIECTOMY SCROTAL Bilateral        Allergies   Allergen Reactions     Bee Venom Anaphylaxis     Hmg-Coa-R Inhibitors Muscle Pain (Myalgia)       Current Outpatient Medications   Medication Sig Dispense Refill     albuterol (VENTOLIN HFA) 108 (90 Base) MCG/ACT inhaler Inhale 2 puffs into the lungs every 4 hours as needed for shortness of breath / dyspnea or wheezing 3 Inhaler 3     amLODIPine (NORVASC) 10 MG tablet TAKE 1 TABLET BY MOUTH EVERY DAY 90 tablet 0     aspirin 81 MG chewable tablet Take 81 mg by mouth daily with food       famotidine (PEPCID) 20 MG tablet Take 1 tablet (20 mg) by mouth 2 times daily       nitroGLYcerin (NITROSTAT) 0.4 MG sublingual tablet Place 1 tablet under the tongue every 5 minutes as needed Max dose of 3 pills per episode.         Social History     Socioeconomic History     Marital status: Single     Spouse name: Not on file     Number of children: Not on file     Years of education: Not on file     Highest education level: Not on file   Occupational History     Not on file   Social Needs     Financial resource strain: Not on file     Food insecurity:     Worry: Not on file     Inability: Not on file     Transportation needs:     Medical: Not on file     Non-medical: Not on file   Tobacco Use     Smoking status: Former Smoker     Packs/day: 0.25     Years: 35.00     Pack years: 8.75     Types: Cigarettes     Last attempt to quit: 2013     Years since quittin.0     Smokeless tobacco: Never Used   Substance and Sexual Activity     Alcohol use: No     Alcohol/week: 0.0 standard drinks     Comment: Alcoholic Drinks/day: occasional     Drug use: Unknown     Types: Other     Comment: Drug use: No     Sexual activity: Not on file    Lifestyle     Physical activity:     Days per week: Not on file     Minutes per session: Not on file     Stress: Not on file   Relationships     Social connections:     Talks on phone: Not on file     Gets together: Not on file     Attends [employer] service: Not on file     Active member of club or organization: Not on file     Attends meetings of clubs or organizations: Not on file     Relationship status: Not on file     Intimate partner violence:     Fear of current or ex partner: Not on file     Emotionally abused: Not on file     Physically abused: Not on file     Forced sexual activity: Not on file   Other Topics Concern     Parent/sibling w/ CABG, MI or angioplasty before 65F 55M? Not Asked   Social History Narrative    Has significant other times 18 years, lives in town.  Not employed.       Review of Systems   Constitutional: Negative.    Endocrine: Negative.    Skin: Negative.    Allergic/Immunologic: Negative.    Hematological: Negative.        Physical Exam  Vitals signs and nursing note reviewed.   Constitutional:       General: He is not in acute distress.     Appearance: Normal appearance. He is not ill-appearing, toxic-appearing or diaphoretic.   Cardiovascular:      Rate and Rhythm: Normal rate and regular rhythm.      Heart sounds: Normal heart sounds.   Pulmonary:      Breath sounds: Decreased breath sounds present. No wheezing, rhonchi or rales.   Skin:     General: Skin is warm and dry.   Neurological:      Mental Status: He is alert. Mental status is at baseline.         Assessment:      ICD-10-CM    1. Essential hypertension I10    2. Chronic obstructive pulmonary disease, unspecified COPD type (H) J44.9 albuterol (VENTOLIN HFA) 108 (90 Base) MCG/ACT inhaler     CBC W PLT No Diff   3. Hyperlipidemia, unspecified hyperlipidemia type E78.5    4. Atherosclerosis of native coronary artery of native heart without angina pectoris I25.10 Comprehensive Metabolic Panel     Lipid Panel   5. Special  screening for malignant neoplasms, colon Z12.11 ABSTRACT COLOGUARD-NO CHARGE       Plan: Medications are reconciled and refilled as needed.  Lab is drawn and pending, I will send him a letter with the results and any recommendations.  Fortunately he is no longer smoking.  As mentioned, he declined immunizations.  After some discussion I was able to talk him into Cologuard screening for colon cancer.  Assuming all goes well, he will follow-up with me as needed.

## 2020-01-21 NOTE — NURSING NOTE
Chief Complaint   Patient presents with     Recheck Medication     Here for medication recheck. He has his med list on him.     Initial There were no vitals taken for this visit. Estimated body mass index is 30.16 kg/m  as calculated from the following:    Height as of 10/18/17: 1.829 m (6').    Weight as of 5/16/18: 100.9 kg (222 lb 6.4 oz).    Medication Reconciliation: complete      Dani Teresa LPN

## 2020-01-22 RX ORDER — ALBUTEROL SULFATE 90 UG/1
AEROSOL, METERED RESPIRATORY (INHALATION)
Qty: 90 G | OUTPATIENT
Start: 2020-01-22

## 2020-01-22 NOTE — TELEPHONE ENCOUNTER
Called and talked with [facility] pharmacy with Akbar.  This is a duplicate request this was done yesterday. Zuleika Acharya RN on 1/22/2020 at 11:24 AM

## 2020-02-10 DIAGNOSIS — I10 ESSENTIAL HYPERTENSION: ICD-10-CM

## 2020-02-11 RX ORDER — AMLODIPINE BESYLATE 10 MG/1
TABLET ORAL
Qty: 90 TABLET | Refills: 3 | Status: SHIPPED | OUTPATIENT
Start: 2020-02-11 | End: 2020-09-04

## 2020-02-11 NOTE — TELEPHONE ENCOUNTER
Last office visit 1/21/2020.  Prescription refilled per RN Medication Refill Policy..................Mena Aguilera RN 2/11/2020 9:34 AM

## 2020-02-17 ENCOUNTER — TELEPHONE (OUTPATIENT)
Dept: INTERNAL MEDICINE | Facility: OTHER | Age: 60
End: 2020-02-17

## 2020-02-17 NOTE — TELEPHONE ENCOUNTER
No answer.  Not able to leave a voicemail.  No voicemail box.  Rae Escobar LPN on 2/17/2020 at 1:44 PM

## 2020-02-17 NOTE — TELEPHONE ENCOUNTER
Patient would like a letter with all the results showing from January blood work.  Rae Escobar LPN on 2/17/2020 at 2:04 PM

## 2020-04-29 ENCOUNTER — TELEPHONE (OUTPATIENT)
Dept: INTERNAL MEDICINE | Facility: OTHER | Age: 60
End: 2020-04-29

## 2020-04-29 DIAGNOSIS — J44.9 CHRONIC OBSTRUCTIVE PULMONARY DISEASE, UNSPECIFIED COPD TYPE (H): Primary | ICD-10-CM

## 2020-04-29 NOTE — TELEPHONE ENCOUNTER
After the patient's full name and date of birth was verified, the patient was notified of the below information.  Rae Escobar LPN on 4/29/2020 at 10:41 AM

## 2020-04-29 NOTE — TELEPHONE ENCOUNTER
Patient informed no antibody testing here at this time.    Patient requested that he receive a new prescription for Advair.  States his breathing was much better when he was on this medication.  Rae Escobar LPN on 4/29/2020 at 10:05 AM

## 2020-06-25 ENCOUNTER — TELEPHONE (OUTPATIENT)
Dept: INTERNAL MEDICINE | Facility: OTHER | Age: 60
End: 2020-06-25

## 2020-06-25 DIAGNOSIS — K21.9 LPRD (LARYNGOPHARYNGEAL REFLUX DISEASE): Primary | ICD-10-CM

## 2020-06-25 RX ORDER — PANTOPRAZOLE SODIUM 40 MG/1
40 TABLET, DELAYED RELEASE ORAL DAILY
Qty: 90 TABLET | Refills: 3 | Status: SHIPPED | OUTPATIENT
Start: 2020-06-25 | End: 2020-09-04

## 2020-06-25 NOTE — TELEPHONE ENCOUNTER
Pt states that the medication Famotidine is back order and will not be available.  He would like an alternate.  He also states that the famotidine is not really working

## 2020-06-25 NOTE — TELEPHONE ENCOUNTER
After the patient's full name and date of birth was verified, the patient was notified of the below information.  Rae Escobar LPN on 6/25/2020 at 10:11 AM

## 2020-09-03 ENCOUNTER — TELEPHONE (OUTPATIENT)
Dept: INTERNAL MEDICINE | Facility: OTHER | Age: 60
End: 2020-09-03

## 2020-09-03 DIAGNOSIS — I10 ESSENTIAL HYPERTENSION: ICD-10-CM

## 2020-09-03 DIAGNOSIS — K21.9 LPRD (LARYNGOPHARYNGEAL REFLUX DISEASE): ICD-10-CM

## 2020-09-03 DIAGNOSIS — J44.9 CHRONIC OBSTRUCTIVE PULMONARY DISEASE, UNSPECIFIED COPD TYPE (H): ICD-10-CM

## 2020-09-03 DIAGNOSIS — J44.9 CHRONIC OBSTRUCTIVE PULMONARY DISEASE, UNSPECIFIED COPD TYPE (H): Primary | ICD-10-CM

## 2020-09-03 NOTE — TELEPHONE ENCOUNTER
[facility] sent Rx request for the followin/29/20 1008  Tin Olivas MD      fluticasone-salmeterol (ADVAIR) 250-50 MCG/DOSE inhaler  3 Inhaler  3  2020   --    Sig - Route: Inhale 1 puff into the lungs every 12 hours - Inhalation      [id number] - [place], [place] - [address]      New prescription approved per [facility] refill protocol and the above order, for #3 inhalers and 1 additional refill. Aminah Amaya RN .............. 9/3/2020  2:17 PM

## 2020-09-04 RX ORDER — PANTOPRAZOLE SODIUM 40 MG/1
40 TABLET, DELAYED RELEASE ORAL DAILY
Qty: 90 TABLET | Refills: 0 | Status: SHIPPED | OUTPATIENT
Start: 2020-09-04

## 2020-09-04 RX ORDER — AMLODIPINE BESYLATE 10 MG/1
10 TABLET ORAL DAILY
Qty: 90 TABLET | Refills: 0 | Status: SHIPPED | OUTPATIENT
Start: 2020-09-04

## 2020-09-04 RX ORDER — ALBUTEROL SULFATE 90 UG/1
2 AEROSOL, METERED RESPIRATORY (INHALATION) EVERY 4 HOURS PRN
Qty: 3 INHALER | Refills: 0 | Status: SHIPPED | OUTPATIENT
Start: 2020-09-04

## 2020-09-04 NOTE — TELEPHONE ENCOUNTER
Prescription approved per [facility] Refill Protocol.  LVO: 1/21/2020    Patient now using [facility] pharmacy , remaining refills on requested medications sent to [facility].    Radha Dowd RN on 9/4/2020 at 11:59 AM